# Patient Record
(demographics unavailable — no encounter records)

---

## 2024-10-07 NOTE — HISTORY OF PRESENT ILLNESS
[FreeTextEntry1] : MALKA RANDHAWA is a 30 y/o F referred by Dr. 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 Reyes office for breast reconstruction tentatively scheduled for BL mastectomy and L SNLB. Was diagnosed at age 28 with L breast cancer, with bx confirming diagnosis. Grandmother on dad's side had ovarian cancer but denies any other known familial history she received 7 rounds of chemotherapy most recently august 7th, says this was her last round she is seeing cardiologist for side effects of taxol, currently wears holter monitor, pending repeat visit/TTE results never smoker, one child, no other medications reports she is a 32C. She had previously seen a plastic surgeon in [facility] who she does not recall the name of, who discussed with her reconstructive options. she would like implants at this time based on that conversation  Interval hx (10/7/2024): Patient now POD5 b/l mx, L SLNB, b/l TE prepec with alloderm filled with 200cc saline. Doing well no complaints. Wearing surgical bra. Recording drain outputs. Pain controlled without narcotics. Completing abx.

## 2024-10-07 NOTE — HISTORY OF PRESENT ILLNESS
[FreeTextEntry1] : MALKA RANDHAWA is a 30 y/o F referred by Dr. 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 Reyes office for breast reconstruction tentatively scheduled for BL mastectomy and L SNLB. Was diagnosed at age 28 with L breast cancer, with bx confirming diagnosis. Grandmother on dad's side had ovarian cancer but denies any other known familial history she received 7 rounds of chemotherapy most recently august 7th, says this was her last round she is seeing cardiologist for side effects of taxol, currently wears holter monitor, pending repeat visit/TTE results never smoker, one child, no other medications reports she is a 32C. She had previously seen a plastic surgeon in [facility] who she does not recall the name of, who discussed with her reconstructive options. she would like implants at this time based on that conversation  Interval hx (10/7/2024): Patient now POD5 b/l mx, L SLNB, b/l TE prepec with alloderm filled with 200cc saline. Doing well no complaints. Wearing surgical bra. Recording drain outputs. Pain controlled without narcotics. Completing abx.

## 2024-10-07 NOTE — PHYSICAL EXAM
[NI] : Normal [de-identified] : Soft, nontender, Karla in place. Incisions cdi with prineo. DENNIS SS x4 [TextEntry] : Breasts: Chemo port palpable at right superior aspect of breast pole with well healed scar BL breast grade II ptosis  Right SNN 20 N IMF 8 BW 14  Left SN N 21 N IMF 8 BW 15   Abdomen: mild donor skin available port scars visible alejandro-umbilical

## 2024-10-07 NOTE — PHYSICAL EXAM
[NI] : Normal [de-identified] : Soft, nontender, Karla in place. Incisions cdi with prineo. DENNIS SS x4 [TextEntry] : Breasts: Chemo port palpable at right superior aspect of breast pole with well healed scar BL breast grade II ptosis  Right SNN 20 N IMF 8 BW 14  Left SN N 21 N IMF 8 BW 15   Abdomen: mild donor skin available port scars visible alejandro-umbilical

## 2024-10-07 NOTE — HISTORY OF PRESENT ILLNESS
[FreeTextEntry1] : MALKA RANDHAWA is a 28 y/o F referred by Dr. 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 Reyes office for breast reconstruction tentatively scheduled for BL mastectomy and L SNLB. Was diagnosed at age 28 with L breast cancer, with bx confirming diagnosis. Grandmother on dad's side had ovarian cancer but denies any other known familial history she received 7 rounds of chemotherapy most recently august 7th, says this was her last round she is seeing cardiologist for side effects of taxol, currently wears holter monitor, pending repeat visit/TTE results never smoker, one child, no other medications reports she is a 32C. She had previously seen a plastic surgeon in [facility] who she does not recall the name of, who discussed with her reconstructive options. she would like implants at this time based on that conversation  Interval hx (10/7/2024): Patient now POD5 b/l mx, L SLNB, b/l TE prepec with alloderm filled with 200cc saline. Doing well no complaints. Wearing surgical bra. Recording drain outputs. Pain controlled without narcotics. Completing abx.

## 2024-10-07 NOTE — PHYSICAL EXAM
[NI] : Normal [de-identified] : Soft, nontender, Karla in place. Incisions cdi with prineo. DENNIS SS x4 [TextEntry] : Breasts: Chemo port palpable at right superior aspect of breast pole with well healed scar BL breast grade II ptosis  Right SNN 20 N IMF 8 BW 14  Left SN N 21 N IMF 8 BW 15   Abdomen: mild donor skin available port scars visible alejandro-umbilical

## 2024-10-07 NOTE — ASSESSMENT
[FreeTextEntry1] : MALKA RANDHAWA is a 30 y/o F referred by Dr. 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  Plan: - 1 week postop - DENNIS #2 and #4 removed - Continue to record remaining drain outputs - Sponge bathe only - RTC 1 week for drain check, plan for first fill in 2 weeks

## 2024-10-07 NOTE — ASSESSMENT
[FreeTextEntry1] : MALKA RANDHAWA is a 28 y/o F referred by Dr. 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  Plan: - 1 week postop - DENNIS #2 and #4 removed - Continue to record remaining drain outputs - Sponge bathe only - RTC 1 week for drain check, plan for first fill in 2 weeks

## 2024-10-14 NOTE — ASSESSMENT
[FreeTextEntry1] : MALKA RANDHAWA is a 30 y/o F referred by Dr. 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  Plan: - return to clinic in 1 week  - continue with DENNIS drains; possible removal next week - possible TE fill next week

## 2024-10-14 NOTE — PHYSICAL EXAM
[de-identified] : Soft, nontender, Karla in place, no fluctuance. Incisions cdi with prineo. DENNIS SS x2.  Chemo port palpable at right superior aspect of breast pole with well healed scar

## 2024-10-14 NOTE — HISTORY OF PRESENT ILLNESS
[FreeTextEntry1] : MALKA RANDHAWA is a 28 y/o F referred by Dr. 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 Reyes office for breast reconstruction tentatively scheduled for BL mastectomy and L SNLB. Was diagnosed at age 28 with L breast cancer, with bx confirming diagnosis. Grandmother on dad's side had ovarian cancer but denies any other known familial history she received 7 rounds of chemotherapy most recently august 7th, says this was her last round she is seeing cardiologist for side effects of taxol, currently wears holter monitor, pending repeat visit/TTE results never smoker, one child, no other medications reports she is a 32C. She had previously seen a plastic surgeon in [facility] who she does not recall the name of, who discussed with her reconstructive options. she would like implants at this time based on that conversation  Interval hx (10/7/2024): Patient now POD5 b/l mx, L SLNB, b/l TE prepec with alloderm filled with 200cc saline. Doing well no complaints. Wearing surgical bra. Recording drain outputs. Pain controlled without narcotics. Completing abx.   Interval hx (10/14/2024): Patient now POD12 b/l mx, L SLNB, b/l TE prepec with alloderm filled with 200cc saline. Doing well without complaints and wearing surgical bra. Sponge bathing to keep dressings dry. Drain outputs have been about 70 cc/24 hours for L DENNIS and 40 cc/24 hours for R DENNIS. she denies pain, fevers, chills, malaise.

## 2024-10-17 NOTE — PHYSICAL EXAM
[Normal] : oriented to person, place and time, with appropriate affect [de-identified] : bilateral nipples and post-mastectomy flaps are well-perfused and viable.  No palpable mass or skin changes.  no ecchymosis or erythema.  bilateral drains intact with serous drainage.   no axillary fullness.   [de-identified] : Normal respiratory effort

## 2024-10-17 NOTE — HISTORY OF PRESENT ILLNESS
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 Here for post-up check.   Left breast cancer diagnosed at age 28. 2024 Patient reports feeling a lump in her left breast. She presented to her GYN's office who started workup.  3/19/24 Left 11N8 core bx revealed poorly differentiated IDC, ER 0%, NE 31-40%, HER-2 negative, Ki-67 90%. High grade DCIS.  24 Left axillary core bx of prominent LN revealed a reactive lymph node. 2024 She was seen for fertility preservation and underwent ovarian stimulation on  with egg retrieval on 24.  24 Neoadjuvant chemotherapy started with dd- Adriamycin & Cytoxan with Neulasta x 4 cycles to be followed by Taxol x 4 cycles q 2 weeks. After two cycles of AC at [facility] ([facility]) she requested to transfer care to [facility] which is closer to home.  24 S/p Taxol C1  24 s/p Taxol C2.  24 Accompanied by her mother. c/o diffuse rash over fingertips and face after latest dose of Taxol. Projected end date of chemotherapy is 24.  24 Met with Cardiology for episodes of near syncope during NAC associated with low blood pressure - plan to repeat echo. Holter monitor placed to monitor for PACs and eval of possible arrhythmia.  24- The last Taxol was withheld due to the exacerbation of pruritus following each treatment. The patient was in the ED two days ago because of severe itching.  9/3/24 Patient presents for surgical planning. Since last visit has completed NAC on 24 - Breast MRI done showing complete treatment response. She also met with plastics clinic on 24 to discuss reconstruction options. Current plan is for staged TE-implant reconstruction.  10/2/24 s/p BL NSM, L SLNB w/ TE's - Complete response to NAC, no residual left breast tumor identified. 0/10 LN negative for cancer.  10/17/24 POD#15  pain well controlled.  good ROM.  bilateral drains intact.  no fevers or chills.   No PMHx.  PSHx: Lap CCY, TMJ surgery - Reports delayed awaking after surgery.  No Meds:  NKDA Family Hx: Ovarian cancer (Paternal grandmother). Colon Cancer (Paternal aunt). Prostate cancer (paternal uncle).  GYN:  Occupation: Housewife  Social: Smoking:  Never       ETOH: Rarely consumes alcohol.

## 2024-10-17 NOTE — RESULTS/DATA
[FreeTextEntry1] : BREAST PATH/RAD REVIEW.  [facility].  3/1/24 BL Dx US: Birads 4.  - Indeterminate L 11:00 1.8 cm hypoechoic mass at area of (palp) concern (Rec US guided bx).  - L 12:30N1 0.5 cm hypoechoic lesion, probably benign (Rec 6-month f/u L Dx US if above ^ core bx is benign).  - No sonographic findings over R 4:00-6:00 additional region of (palp) concern. (Rec clinical f/u).  - Multiple b/l breast cysts (R up to 1.9 cm and L up to 0.8 cm)   3/19/24 Left [11N8] US guided core bx ([facility]): IDC (G3/poorly diff). DCIS (G3/ High grade) extends into the lobules. ER 0%, ND 31-40%, Ki-67 90%, HER2 (1+) Negative. Hemant Clip. Concordant.  (Rec Surg C/S, BL Dx MG is due at this time, given malignant core bx results a US guided bx of Left 12:30N1 lesion is now recommended).   4/3/24 Slide Review:  Left 11N8 core bx: Infiltrating poorly differentiated ductal carcinoma. High-grade DCIS. Receptors per outside report.   4/3/24 Breast MRI: BIRADS 6.  - R breast w/o suspicious enhancement.  - L UIQ 2.4 cm known large IDC.  - L 12:00 w/o suspicious findings corresponding to area for which biopsy was previously recommended; there are cysts in this region (Rec repeat US of 12-1:00 axis; possible bx)  - L 0.8 cm prominent low-lying L axillary LN which may correspond to (palp) finding (Rec Dx US w/ possible US guided bx).   4/5/24 B/L Dx MG/ L US: Birads 6. Extremely dense.  - Palpable Left superior slightly medial IDC  - L scattered breast cysts including one in the L UOQ measuring 0.8 cm. No suspicious findings at L 12-1:00.  - L 11N8 mass at site of known IDC to be 2.4 cm.  - L prominent LN w/ cortex measuring 0.5 cm (Rec US guided bx)   4/5/24 Left [axillary] US guided core bx: Reactive LN. Twirl Clip. Benign and concordant. (Rec Surg C/S).   8/28/24 Breast MRI s/p NAC: birads 6.  - R 9:00 0.6 cm inflamed cyst.  - L posterior UIQ bx clip corresponding to site of bx-proven cancer w/ interval resolution of previously noted 2.4 cm enhancing mass.  - L bx clip within small axillary LN, minimally decreased in size compared w/ prior study c/w bx-proven benign LN.   10/2/24 s/p BL NSM, L SLNB w/ TE's. ypT0N0  - Left mastectomy: No residual tumor identified s/p NAC. Tumor bed measures 2.2 x 1.5 x 0.8 cm. 0/10 Lymph nodes negative for carcinoma.   - Right mastectomy: FCC.

## 2024-10-17 NOTE — ASSESSMENT
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 Here for post-up check.  4/6/24 [facility] Genetic testing (21 gene panel): Negative  We discussed her surgical Pathology showed no residual left breast cancer after chemotherapy.  No lymph node involvement 0/10.   - We discussed risk of lymphedema for left arm and discussed preventive measures.   Will refer to PT/Lymphedema clinic for lymphedema prevention counseling.    - Following w/ Medical oncology (Dr. Nova). Completed NAC on 8/7/24 - last dose of Taxol held 2/2 exacerbation of pruritus.  - Following w/ Plastics Clinic - pending drain removals and gradual TE filling.   - Seen by Genetic counseling.  - RTO 3 months.

## 2024-10-17 NOTE — REASON FOR VISIT
[de-identified] : Bilateral nipple sparing mastectomy, Left sentinel lymph node biopsy.  [de-identified] : 10/2/24 [de-identified] : 15

## 2024-10-23 NOTE — PHYSICAL EXAM
[Well Developed] : well developed [Well Nourished] : well nourished [No Acute Distress] : no acute distress [Normal Conjunctiva] : normal conjunctiva [Normal Venous Pressure] : normal venous pressure [No Carotid Bruit] : no carotid bruit [Normal S1, S2] : normal S1, S2 [No Murmur] : no murmur [No Rub] : no rub [No Gallop] : no gallop [Clear Lung Fields] : clear lung fields [Good Air Entry] : good air entry [No Respiratory Distress] : no respiratory distress  [Soft] : abdomen soft [Non Tender] : non-tender [No Masses/organomegaly] : no masses/organomegaly [Normal Gait] : normal gait [No Edema] : no edema [No Cyanosis] : no cyanosis [No Clubbing] : no clubbing [No Varicosities] : no varicosities [Normal] : alert and oriented, normal memory

## 2024-10-24 NOTE — ASSESSMENT
[FreeTextEntry1] : Ms. Teri Quintanilla is a 30yo F with infiltrating poorly differentiated ductal carcinoma, ER negative (0%), AL 31-40%, HER-2 negative, s/p neoadjuvant AC (dose dense Adriamycin (60 mg/m2), cytoxan) bilateral mastectomy,  Echo during treatment with normal EF, normal strain.  With presyncope thought to be vasovagal alejandro-treatment cycles, and palpitations. Holter monitor with 6% PACs, symptomatic.   Plan: - TTE, biomarkers benign.  - Holter monitor w/ 6% PACs. Metop 12.5mg XL PRN palpitations  - Sleep monitor/referral for suspicion of ROMELIA; will contact office for home sleep apnea study  RTC x 6 months to see me

## 2024-10-24 NOTE — HISTORY OF PRESENT ILLNESS
[FreeTextEntry1] : Ms. Teri Quintanilal is a 28yo F with infiltrating poorly differentiated ductal carcinoma, ER negative (0%), HI 31-40%, HER-2 negative (diagnosed at age 28).  Last seen in 8/2024. At that time, reported frequent presyncope and syncope with AC (adriamycin/cytoxan). Sometimes they are typical in presentation to vasovagal where they are preceded by a flushing sensation and darkening of her vision, but other times they happen suddenly and she hardly has time to catch her footing, hold onto a railing. These occur randomly, not necessarily with changes in position/orthostatic, not necessarily right after chemo infusions. Her BP checks at home when these episodes occur are low in the systolic 80s and improve with encouraged PO intake and hydration. She otherwise denied any chest pain, orthopnea, GEOVANY, weight gain or loss (~5lbs variance at all times). She says she continues to have symptoms of palpitations in the evening time. A holter was obtained showing PACs with burden 6%. TTE was normal (including with normal strain) and biomarkers were benign.   Taxol has been held most recently due to exacerbation of pruritis following each treatment. Received topical steroids and medrol as well. She ultimately completed treatment 8/7/24.  Her  noted that she has snoring episodes and goes apneic, has never had a sleep study. I placed an order last visit and patient says no one has reached out yet.   Oncology history: 4/2024 She was seen for fertility preservation and underwent ovarian stimulation on 4/19 with egg retrieval on 5/1/24 without complication. 5/7/24 Mediport placement Initially followed at [facility] ([facility]), now transferred care to [facility] Chemo: 5/8/24 Neoadjuvant chemotherapy started with dose dense Adriamycin (60 mg/m2) & Cytoxan (600 mg/m2) with Neulasta x 4 cycles to be followed by Taxol (175 mg/m2) x 4 cycles, given every 2 weeks. Completed NAC on 8/7/24 - last dose of Taxol held 2/2 exacerbation of pruritus. Radiation: None Surgery:  Recent breast MRI reviewed consistent with complete response to NAC; underwent Bilateral nipple sparing mastectomy, Left sentinel lymph node biopsy, axillary lymph node dissection with TE reconstruction. Surgical Pathology showed no residual left breast cancer after chemotherapy. No lymph node involvement 0/10.  PMHx: as above PSurghx: fertility preservation and underwent ovarian stimulation on 4/19 with egg retrieval on 5/1/24 without complication. CHolecystectomy Family History: She has a family history of ovarian cancer in her paternal grandmother; she has a paternal aunt who was diagnosed with colon cancer; she is a paternal uncle who was diagnosed with prostate cancer. No family risk of undergoing genetic testing. There is no family history of breast cancer. Mom has ? Shx: Used to work as a . She lives at home with her 3 year old son and her boyfriend. No toxics/illicit substances  Cardiac studies: 4/19/24 SR normal axis, no significant ST changes, normal intervals 5/2/24 Echocardiogram revealed normal LV and RV size and function with LVEF = 64% and LV Global longitudinal strain -22% (normal < -19%). 8/14/24 EKG: SR with PACs x2 8/27/24 Holter: SR with 6% PACs (isolated SVEs) 9/2023 TTE EF 59%, nml RV function, no valvular disease or effusion. Left GLS -19% 10/23/24: SR 88

## 2024-10-24 NOTE — HISTORY OF PRESENT ILLNESS
[FreeTextEntry1] : Ms. Teri Quintanilla is a 30yo F with infiltrating poorly differentiated ductal carcinoma, ER negative (0%), TN 31-40%, HER-2 negative (diagnosed at age 28).  Last seen in 8/2024. At that time, reported frequent presyncope and syncope with AC (adriamycin/cytoxan). Sometimes they are typical in presentation to vasovagal where they are preceded by a flushing sensation and darkening of her vision, but other times they happen suddenly and she hardly has time to catch her footing, hold onto a railing. These occur randomly, not necessarily with changes in position/orthostatic, not necessarily right after chemo infusions. Her BP checks at home when these episodes occur are low in the systolic 80s and improve with encouraged PO intake and hydration. She otherwise denied any chest pain, orthopnea, GEOVANY, weight gain or loss (~5lbs variance at all times). She says she continues to have symptoms of palpitations in the evening time. A holter was obtained showing PACs with burden 6%. TTE was normal (including with normal strain) and biomarkers were benign.   Taxol has been held most recently due to exacerbation of pruritis following each treatment. Received topical steroids and medrol as well. She ultimately completed treatment 8/7/24.  Her  noted that she has snoring episodes and goes apneic, has never had a sleep study. I placed an order last visit and patient says no one has reached out yet.   Oncology history: 4/2024 She was seen for fertility preservation and underwent ovarian stimulation on 4/19 with egg retrieval on 5/1/24 without complication. 5/7/24 Mediport placement Initially followed at [facility] ([facility]), now transferred care to [facility] Chemo: 5/8/24 Neoadjuvant chemotherapy started with dose dense Adriamycin (60 mg/m2) & Cytoxan (600 mg/m2) with Neulasta x 4 cycles to be followed by Taxol (175 mg/m2) x 4 cycles, given every 2 weeks. Completed NAC on 8/7/24 - last dose of Taxol held 2/2 exacerbation of pruritus. Radiation: None Surgery:  Recent breast MRI reviewed consistent with complete response to NAC; underwent Bilateral nipple sparing mastectomy, Left sentinel lymph node biopsy, axillary lymph node dissection with TE reconstruction. Surgical Pathology showed no residual left breast cancer after chemotherapy. No lymph node involvement 0/10.  PMHx: as above PSurghx: fertility preservation and underwent ovarian stimulation on 4/19 with egg retrieval on 5/1/24 without complication. CHolecystectomy Family History: She has a family history of ovarian cancer in her paternal grandmother; she has a paternal aunt who was diagnosed with colon cancer; she is a paternal uncle who was diagnosed with prostate cancer. No family risk of undergoing genetic testing. There is no family history of breast cancer. Mom has ? Shx: Used to work as a . She lives at home with her 3 year old son and her boyfriend. No toxics/illicit substances  Cardiac studies: 4/19/24 SR normal axis, no significant ST changes, normal intervals 5/2/24 Echocardiogram revealed normal LV and RV size and function with LVEF = 64% and LV Global longitudinal strain -22% (normal < -19%). 8/14/24 EKG: SR with PACs x2 8/27/24 Holter: SR with 6% PACs (isolated SVEs) 9/2023 TTE EF 59%, nml RV function, no valvular disease or effusion. Left GLS -19% 10/23/24: SR 88

## 2024-10-24 NOTE — END OF VISIT
[FreeTextEntry3] : Teri Quintanilla is a 29-year-old woman with infiltrating poorly differentiated ductal carcinoma, ER negative (0%), FL 31-40%, HER-2 negative (diagnosed at age 28). She was last seen in Aug 2024. At that time, there were episodes of presyncope and syncope on AC (adriamycin/cytoxan), likely vasovagal as they are preceded by a flushing sensation and darkening of her vision, though sometimes episodes occur suddenly and randomly, not necessarily with changes in position or after chemo infusions. Self-obtained BP  at the time of episodes note SBP low 80s mm Hg and improve with encouraged PO intake and hydration. She otherwise denied any chest pain, orthopnea, GEOVANY, weight gain or loss (~5lbs variance). There are intermittent palpitations, often in the evenings. Twenty-four hour Holter showed PACs with burden 6%. TTE was normal (including with normal strain) and biomarkers were benign. Taxol was held most recently due to exacerbation of pruritis following each treatment, s/p topical steroids and oral Medrol. She ultimately completed treatment 8/7/24. Her  witnessed episodes of snoring and apnea. She has not had sleep study.  Oncology history: 4/2024 She was seen for fertility preservation and underwent ovarian stimulation on 4/19 with egg retrieval on 5/1/24 without complication. 5/7/24 Mediport placement Initially followed at [facility]), now transferred care to [facility] Chemo: 5/8/24 Neoadjuvant chemotherapy started with dose dense Adriamycin (60 mg/m2) & Cytoxan (600 mg/m2) with Neulasta x 4 cycles to be followed by Taxol (175 mg/m2) x 4 cycles, given every 2 weeks. Completed NAC on 8/7/24 - last dose of Taxol held 2/2 exacerbation of pruritus. Radiation: None Surgery: Recent breast MRI reviewed consistent with complete response to NAC; underwent Bilateral nipple sparing mastectomy, Left sentinel lymph node biopsy, axillary lymph node dissection with TE reconstruction. Surgical Pathology showed no residual left breast cancer after chemotherapy. No lymph node involvement 0/10.   Cardiac studies: 4/19/24 SR normal axis, no significant ST changes, normal intervals 5/2/24 Echocardiogram revealed normal LV and RV size and function with LVEF = 64% and LV Global longitudinal strain -22% (normal < -19%). 8/14/24 EKG: SR with PACs x2 8/27/24 Holter: SR with 6% PACs (isolated SVEs) 9/2023 TTE EF 59%, nml RV function, no valvular disease or effusion. Left GLS -19% ECG 10/23/24: NSR at 88 bpm with normal ST-segments and T-waves Based on overall findings, management will include periodic TTE monitoring for cardiotoxicity. Start metoprolol succinate ER 25 mg, one half tablet daily during periods of palpitations.  Order Home Sleep Apnea Test.

## 2024-10-24 NOTE — ASSESSMENT
[FreeTextEntry1] : Ms. Teri Quintanilla is a 28yo F with infiltrating poorly differentiated ductal carcinoma, ER negative (0%), DE 31-40%, HER-2 negative, s/p neoadjuvant AC (dose dense Adriamycin (60 mg/m2), cytoxan) bilateral mastectomy,  Echo during treatment with normal EF, normal strain.  With presyncope thought to be vasovagal alejandro-treatment cycles, and palpitations. Holter monitor with 6% PACs, symptomatic.   Plan: - TTE, biomarkers benign.  - Holter monitor w/ 6% PACs. Metop 12.5mg XL PRN palpitations  - Sleep monitor/referral for suspicion of ROMELIA; will contact office for home sleep apnea study  RTC x 6 months to see me

## 2024-10-24 NOTE — END OF VISIT
[FreeTextEntry3] : Teri Quintanilla is a 29-year-old woman with infiltrating poorly differentiated ductal carcinoma, ER negative (0%), NV 31-40%, HER-2 negative (diagnosed at age 28). She was last seen in Aug 2024. At that time, there were episodes of presyncope and syncope on AC (adriamycin/cytoxan), likely vasovagal as they are preceded by a flushing sensation and darkening of her vision, though sometimes episodes occur suddenly and randomly, not necessarily with changes in position or after chemo infusions. Self-obtained BP  at the time of episodes note SBP low 80s mm Hg and improve with encouraged PO intake and hydration. She otherwise denied any chest pain, orthopnea, GEOVANY, weight gain or loss (~5lbs variance). There are intermittent palpitations, often in the evenings. Twenty-four hour Holter showed PACs with burden 6%. TTE was normal (including with normal strain) and biomarkers were benign. Taxol was held most recently due to exacerbation of pruritis following each treatment, s/p topical steroids and oral Medrol. She ultimately completed treatment 8/7/24. Her  witnessed episodes of snoring and apnea. She has not had sleep study.  Oncology history: 4/2024 She was seen for fertility preservation and underwent ovarian stimulation on 4/19 with egg retrieval on 5/1/24 without complication. 5/7/24 Mediport placement Initially followed at [facility]), now transferred care to [facility] Chemo: 5/8/24 Neoadjuvant chemotherapy started with dose dense Adriamycin (60 mg/m2) & Cytoxan (600 mg/m2) with Neulasta x 4 cycles to be followed by Taxol (175 mg/m2) x 4 cycles, given every 2 weeks. Completed NAC on 8/7/24 - last dose of Taxol held 2/2 exacerbation of pruritus. Radiation: None Surgery: Recent breast MRI reviewed consistent with complete response to NAC; underwent Bilateral nipple sparing mastectomy, Left sentinel lymph node biopsy, axillary lymph node dissection with TE reconstruction. Surgical Pathology showed no residual left breast cancer after chemotherapy. No lymph node involvement 0/10.   Cardiac studies: 4/19/24 SR normal axis, no significant ST changes, normal intervals 5/2/24 Echocardiogram revealed normal LV and RV size and function with LVEF = 64% and LV Global longitudinal strain -22% (normal < -19%). 8/14/24 EKG: SR with PACs x2 8/27/24 Holter: SR with 6% PACs (isolated SVEs) 9/2023 TTE EF 59%, nml RV function, no valvular disease or effusion. Left GLS -19% ECG 10/23/24: NSR at 88 bpm with normal ST-segments and T-waves Based on overall findings, management will include periodic TTE monitoring for cardiotoxicity. Start metoprolol succinate ER 25 mg, one half tablet daily during periods of palpitations.  Order Home Sleep Apnea Test.

## 2024-10-28 NOTE — PHYSICAL EXAM
[de-identified] : Other: Breasts: Soft, nontender, Karla in place, no fluctuance. Incisions cdi with prineo. DENNIS SS x2.  Chemo port palpable at right superior aspect of breast pole with well healed scar

## 2024-10-28 NOTE — PHYSICAL EXAM
[de-identified] : Other: Breasts: Soft, nontender, Karla in place, no fluctuance. Incisions cdi with prineo. DENNIS SS x2.  Chemo port palpable at right superior aspect of breast pole with well healed scar

## 2024-10-28 NOTE — HISTORY OF PRESENT ILLNESS
[FreeTextEntry1] : MALKA RANDHAWA is a 28 y/o F referred by Dr. 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 Reyes's office for breast reconstruction tentatively scheduled for BL mastectomy and L SNLB. Was diagnosed at age 28 with L breast cancer, with bx confirming diagnosis. Grandmother on dad's side had ovarian cancer but denies any other known familial history. She received 7 rounds of chemotherapy most recently august 7th, says this was her last round. She is seeing a cardiologist for side effects of taxol, currently wears holter monitor, pending repeat visit/TTE results. Never smoker, one child, no other medications. Reports she is a 32C. She had previously seen a plastic surgeon at [facility] whom she does not recall the name of, who discussed with her reconstructive options. she would like implants at this time based on that conversation.  Interval hx (10/7/2024): Patient now POD5 b/l mx, L SLNB, b/l TE prepec with alloderm filled with 200cc saline. Doing well no complaints. Wearing surgical bra. Recording drain outputs. Pain controlled without narcotics. Completing abx.  Interval hx (10/14/2024): Patient now POD12 b/l mx, L SLNB, b/l TE prepec with alloderm filled with 200cc saline. Doing well without complaints and wearing surgical bra. Sponge bathing to keep dressings dry. Drain outputs have been about 70 cc/24 hours for L DENNIS and 40 cc/24 hours for R DENNIS. she denies pain, fevers, chills, malaise.  Interval hx (10/21/2024): Patient now POD19 s/p BL mx, L SLNB, BL TE prepec with alloderm. Currently with 200cc saline from OR. No issues except some muscle discomfort from the left superior TE tab attachment. Has not been showering. Drain outputs low b/l, removed in clinic.  Interval hx (10/28/2024): Patient now POD26 s/p BL mx, L SLNB, BL TE prepec with alloderm. Currently with 300cc saline. no issues. showering. no errythema. no discharge. no swelling. L breast larger than Right. Filled in clinic 10/28 w/ 100 cc bilaterally for a total of 400

## 2024-10-28 NOTE — ASSESSMENT
[FreeTextEntry1] : MALKA RANDHAWA is a 30 y/o F referred by Dr. 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  Plan: - 100cc fill added b/l, now 400cc saline per side - 200 post op, 100cc - 10/21, 100cc - 10/28 - RTC 1 week.  PRS

## 2024-10-28 NOTE — ASSESSMENT
[FreeTextEntry1] : MALKA RANDHAWA is a 28 y/o F referred by Dr. 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  Plan: - 100cc fill added b/l, now 400cc saline per side - 200 post op, 100cc - 10/21, 100cc - 10/28 - RTC 1 week.  PRS

## 2024-10-28 NOTE — HISTORY OF PRESENT ILLNESS
[FreeTextEntry1] : MALKA RANDHAWA is a 28 y/o F referred by Dr. 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 Reyes's office for breast reconstruction tentatively scheduled for BL mastectomy and L SNLB. Was diagnosed at age 28 with L breast cancer, with bx confirming diagnosis. Grandmother on dad's side had ovarian cancer but denies any other known familial history. She received 7 rounds of chemotherapy most recently august 7th, says this was her last round. She is seeing a cardiologist for side effects of taxol, currently wears holter monitor, pending repeat visit/TTE results. Never smoker, one child, no other medications. Reports she is a 32C. She had previously seen a plastic surgeon at [facility] whom she does not recall the name of, who discussed with her reconstructive options. she would like implants at this time based on that conversation.  Interval hx (10/7/2024): Patient now POD5 b/l mx, L SLNB, b/l TE prepec with alloderm filled with 200cc saline. Doing well no complaints. Wearing surgical bra. Recording drain outputs. Pain controlled without narcotics. Completing abx.  Interval hx (10/14/2024): Patient now POD12 b/l mx, L SLNB, b/l TE prepec with alloderm filled with 200cc saline. Doing well without complaints and wearing surgical bra. Sponge bathing to keep dressings dry. Drain outputs have been about 70 cc/24 hours for L DENNIS and 40 cc/24 hours for R DENNIS. she denies pain, fevers, chills, malaise.  Interval hx (10/21/2024): Patient now POD19 s/p BL mx, L SLNB, BL TE prepec with alloderm. Currently with 200cc saline from OR. No issues except some muscle discomfort from the left superior TE tab attachment. Has not been showering. Drain outputs low b/l, removed in clinic.  Interval hx (10/28/2024): Patient now POD26 s/p BL mx, L SLNB, BL TE prepec with alloderm. Currently with 300cc saline. no issues. showering. no errythema. no discharge. no swelling. L breast larger than Right. Filled in clinic 10/28 w/ 100 cc bilaterally for a total of 400

## 2024-10-30 NOTE — PAST MEDICAL HISTORY
[Menstruating] : The patient is menstruating [Definite ___ (Date)] : the last menstrual period was [unfilled] [Regular Cycle Intervals] : have been regular [Live Births ___] : P[unfilled]  [Age At Live Birth ___] : Age at live birth: [unfilled] [History of Hormone Replacement Treatment] : has no history of hormone replacement treatment

## 2024-10-30 NOTE — PHYSICAL EXAM
[Fully active, able to carry on all pre-disease performance without restriction] : Status 0 - Fully active, able to carry on all pre-disease performance without restriction [Normal] : affect appropriate [de-identified] : Bilateral nipple sparing mastectomies with TE reconstructions.  [de-identified] : Fine macular rash on arms

## 2024-10-30 NOTE — HISTORY OF PRESENT ILLNESS
[Disease: _____________________] : Disease: [unfilled] [T: ___] : T[unfilled] [N: ___] : N[unfilled] [M: ___] : M[unfilled] [AJCC Stage: ____] : AJCC Stage: [unfilled] [de-identified] : Left breast cancer diagnosed at age 28. The patient felt a lump in her left breast in 1/2024. She presented to her GYN's office. 3/1/24 Bilateral ultrasound identified a 1.8 cm mass in the left breast at 11:00. No abnormal axillary lymph nodes identified. 3/19/24 Left breast core biopsy revealed infiltrating poorly differentiated ductal carcinoma, ER negative (0%), CO 31-40%, HER-2 negative, Ki-67 90%. Also noted was DCIS, high-grade, solid type. 4/3/24 Breast MRI revealed no suspicious enhancement in the right breast and negative for right axillary adenopathy. Evaluation of the left breast demonstrates known large invasive ductal carcinoma in the upper/inner quadrant measuring 2.4 cm. There is no suspicious finding at the 12:00 axis of the left breast to correspond to the finding on sonogram for which biopsy was previously recommended; there are cysts in this region and repeat ultrasound is recommended. There is a prominent low-lying axillary lymph node measuring 8 mm which may correspond to the palpable finding and targeted ultrasound to potentially guide biopsy is recommended. 4/5/24 Bilateral diagnostic mammogram and sonogram revealed extremely dense breasts which lowers the sensitivity of mammography. Evaluation of the left breast demonstrates a mass far posteriorly superiorly containing clip from prior biopsy, corresponding to known carcinoma measuring 2.3 x 2.4 cm. There is a prominent lymph node in left axilla with cortex measuring 5 mm for which ultrasound-guided biopsy is recommended. No suspicious calcifications. Evaluation of the right breast demonstrates no suspicious finding. 4/5/24 Core biopsy of left axillary lymph node revealed reactive lymph node. 4/2024 She was seen for fertility preservation and underwent ovarian stimulation on 4/19 with egg retrieval on 5/1/24 without complication. 5/2/24 Echocardiogram revealed normal LV and RV size and function with LVEF = 64% and LV Global longitudinal strain -22% (normal < -19%). 5/7/24 Mediport placement with tip of catheter in right atrium and approved for use. 5/8/24 - 8/21/24 Neoadjuvant chemotherapy with dose dense Adriamycin (60 mg/m2) & Cytoxan (600 mg/m2) with Neulasta x 4 cycles followed by Taxol (175 mg/m2) x 4 cycles, given every 2 weeks. After two cycles of AC at [facility]) she requested to transfer care to [facility] which is closer to home. 10/2/24 Bilateral nipple sparing mastectomies with Dr. Reyes and tissue expander reconstruction by Dr. Byrd.      Left breast showed no residual tumor.  Tumor bed measured 2.2 x 1.5 x 0.8 cm. 0/10 SLN.      Right breast showed fibrocystic changes 11/5/24 Lupron 22.5 mg every 3 months with exemestane 25 mg daily.    [de-identified] : Clinically ~2 cm IDC, LN negative on biopsy, ER 0%, AZ 31-40%, HER-2 negative, Ki-67 90% [de-identified] : Teri completed chemotherapy in  with the last cycle of Taxol held due to pruritus c/w drug intolerance. She had bilateral mastectomies on 10/2/24 which revealed no residual disease. Her energy is improving and she has recovered well. No further syncopal events. LMP 24. She is getting hot flashes which do wake her up at night.  HEALTH MAINTENANCE: PCP: Dr. Nitza Murray in [place] GYN: Dr. Fox Ivy in [place] Mammogram & sonogram: 24 Pap Smear:  negative Genetic testin24 [employer] 21 gene panel with no clinically significant variants detected Family History: She has a family history of ovarian cancer in her paternal grandmother; she has a paternal aunt who was diagnosed with colon cancer; she is a paternal uncle who was diagnosed with prostate cancer.  No family risk of undergoing genetic testing.  There is no family history of breast cancer.

## 2024-10-30 NOTE — PHYSICAL EXAM
[Fully active, able to carry on all pre-disease performance without restriction] : Status 0 - Fully active, able to carry on all pre-disease performance without restriction [Normal] : affect appropriate [de-identified] : Bilateral nipple sparing mastectomies with TE reconstructions.  [de-identified] : Fine macular rash on arms

## 2024-10-30 NOTE — HISTORY OF PRESENT ILLNESS
[Disease: _____________________] : Disease: [unfilled] [T: ___] : T[unfilled] [N: ___] : N[unfilled] [M: ___] : M[unfilled] [AJCC Stage: ____] : AJCC Stage: [unfilled] [de-identified] : Left breast cancer diagnosed at age 28. The patient felt a lump in her left breast in 1/2024. She presented to her GYN's office. 3/1/24 Bilateral ultrasound identified a 1.8 cm mass in the left breast at 11:00. No abnormal axillary lymph nodes identified. 3/19/24 Left breast core biopsy revealed infiltrating poorly differentiated ductal carcinoma, ER negative (0%), [place] 31-40%, HER-2 negative, Ki-67 90%. Also noted was DCIS, high-grade, solid type. 4/3/24 Breast MRI revealed no suspicious enhancement in the right breast and negative for right axillary adenopathy. Evaluation of the left breast demonstrates known large invasive ductal carcinoma in the upper/inner quadrant measuring 2.4 cm. There is no suspicious finding at the 12:00 axis of the left breast to correspond to the finding on sonogram for which biopsy was previously recommended; there are cysts in this region and repeat ultrasound is recommended. There is a prominent low-lying axillary lymph node measuring 8 mm which may correspond to the palpable finding and targeted ultrasound to potentially guide biopsy is recommended. 4/5/24 Bilateral diagnostic mammogram and sonogram revealed extremely dense breasts which lowers the sensitivity of mammography. Evaluation of the left breast demonstrates a mass far posteriorly superiorly containing clip from prior biopsy, corresponding to known carcinoma measuring 2.3 x 2.4 cm. There is a prominent lymph node in left axilla with cortex measuring 5 mm for which ultrasound-guided biopsy is recommended. No suspicious calcifications. Evaluation of the right breast demonstrates no suspicious finding. 4/5/24 Core biopsy of left axillary lymph node revealed reactive lymph node. 4/2024 She was seen for fertility preservation and underwent ovarian stimulation on 4/19 with egg retrieval on 5/1/24 without complication. 5/2/24 Echocardiogram revealed normal LV and RV size and function with LVEF = 64% and LV Global longitudinal strain -22% (normal < -19%). 5/7/24 Mediport placement with tip of catheter in right atrium and approved for use. 5/8/24 - 8/21/24 Neoadjuvant chemotherapy with dose dense Adriamycin (60 mg/m2) & Cytoxan (600 mg/m2) with Neulasta x 4 cycles followed by Taxol (175 mg/m2) x 4 cycles, given every 2 weeks. After two cycles of AC at [facility]) she requested to transfer care to [facility] [place] which is closer to home. 10/2/24 Bilateral nipple sparing mastectomies with Dr. Reyes and tissue expander reconstruction by Dr. Byrd.      Left breast showed no residual tumor.  Tumor bed measured 2.2 x 1.5 x 0.8 cm. 0/10 SLN.      Right breast showed fibrocystic changes 11/5/24 Lupron 22.5 mg every 3 months with exemestane 25 mg daily.    [de-identified] : Clinically ~2 cm IDC, LN negative on biopsy, ER 0%, NE 31-40%, HER-2 negative, Ki-67 90% [de-identified] : Teri completed chemotherapy in  with the last cycle of Taxol held due to pruritus c/w drug intolerance. She had bilateral mastectomies on 10/2/24 which revealed no residual disease. Her energy is improving and she has recovered well. No further syncopal events. LMP 24. She is getting hot flashes which do wake her up at night.  HEALTH MAINTENANCE: PCP: Dr. Nitza Murray in [place] GYN: Dr. Fox Ivy in [place] Mammogram & sonogram: 24 Pap Smear:  negative Genetic testin24 [employer] 21 gene panel with no clinically significant variants detected Family History: She has a family history of ovarian cancer in her paternal grandmother; she has a paternal aunt who was diagnosed with colon cancer; she is a paternal uncle who was diagnosed with prostate cancer.  No family risk of undergoing genetic testing.  There is no family history of breast cancer.

## 2024-11-04 NOTE — HISTORY OF PRESENT ILLNESS
[FreeTextEntry1] : MALKA RANDHAWA is a 30 y/o F referred by Dr. 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 Reyes's office for breast reconstruction tentatively scheduled for BL mastectomy and L SNLB. Was diagnosed at age 28 with L breast cancer, with bx confirming diagnosis. Grandmother on dad's side had ovarian cancer but denies any other known familial history. She received 7 rounds of chemotherapy most recently august 7th, says this was her last round. She is seeing a cardiologist for side effects of taxol, currently wears holter monitor, pending repeat visit/TTE results. Never smoker, one child, no other medications. Reports she is a 32C. She had previously seen a plastic surgeon at [facility] whom she does not recall the name of, who discussed with her reconstructive options. she would like implants at this time based on that conversation.  Interval hx (10/7/2024): Patient now POD5 b/l mx, L SLNB, b/l TE prepec with alloderm filled with 200cc saline. Doing well no complaints. Wearing surgical bra. Recording drain outputs. Pain controlled without narcotics. Completing abx.  Interval hx (10/14/2024): Patient now POD12 b/l mx, L SLNB, b/l TE prepec with alloderm filled with 200cc saline. Doing well without complaints and wearing surgical bra. Sponge bathing to keep dressings dry. Drain outputs have been about 70 cc/24 hours for L DENNIS and 40 cc/24 hours for R DENNIS. she denies pain, fevers, chills, malaise.  Interval hx (10/21/2024): Patient now POD19 s/p BL mx, L SLNB, BL TE prepec with alloderm. Currently with 200cc saline from OR. No issues except some muscle discomfort from the left superior TE tab attachment. Has not been showering. Drain outputs low b/l, removed in clinic.  Interval hx (10/28/2024): Patient now POD26 s/p BL mx, L SLNB, BL TE prepec with alloderm. Currently with 300cc saline. no issues. showering. no erythema. no discharge. no swelling. L breast larger than Right. Filled in clinic 10/28 w/ 100 cc bilaterally for a total of 400  Interval hx (11/04/24): Patient now POD33 s/p BL mx, L SLNB, BL TE prepec with alloderm. Currently with 400cc saline.  11/4/24: Patient doing well. No fevers, chills. Notes L arm pain and is starting some massage therapy. Notes intermittent medial chest pain, but seems to come and go without any provocation.

## 2024-11-04 NOTE — PHYSICAL EXAM
[de-identified] : Other: Other: Breasts: Soft, nontender, Karla in place, no fluctuance. Incisions cdi with prineo. DENNIS SS x2.  Chemo port palpable at right superior aspect of breast pole with well healed scar

## 2024-11-04 NOTE — ASSESSMENT
[FreeTextEntry1] : MALKA RANDHAWA is a 28 y/o F referred by Dr. 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  Patient filled 100cc bilaterally, now at 500cc. Initial tissue expander was 500cc so likely at final fill. patient has desire to possibly overfill. Will discuss at next visit. noted to have some L breast tightness during last 10cc of fill that passed almost immediately.   Plan: - 100cc fill added b/l, now 500cc saline per side - 200 post op, 100cc - 10/21, 100cc - 10/28 - RTC 1 week to discuss possibly overexpand vs booking for surgery for exchange  Jose Bray PRS PGY3

## 2024-11-04 NOTE — HISTORY OF PRESENT ILLNESS
[FreeTextEntry1] : MALKA RANDHAWA is a 30 y/o F referred by Dr. 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 Reyes's office for breast reconstruction tentatively scheduled for BL mastectomy and L SNLB. Was diagnosed at age 28 with L breast cancer, with bx confirming diagnosis. Grandmother on dad's side had ovarian cancer but denies any other known familial history. She received 7 rounds of chemotherapy most recently august 7th, says this was her last round. She is seeing a cardiologist for side effects of taxol, currently wears holter monitor, pending repeat visit/TTE results. Never smoker, one child, no other medications. Reports she is a 32C. She had previously seen a plastic surgeon at [facility] whom she does not recall the name of, who discussed with her reconstructive options. she would like implants at this time based on that conversation.  Interval hx (10/7/2024): Patient now POD5 b/l mx, L SLNB, b/l TE prepec with alloderm filled with 200cc saline. Doing well no complaints. Wearing surgical bra. Recording drain outputs. Pain controlled without narcotics. Completing abx.  Interval hx (10/14/2024): Patient now POD12 b/l mx, L SLNB, b/l TE prepec with alloderm filled with 200cc saline. Doing well without complaints and wearing surgical bra. Sponge bathing to keep dressings dry. Drain outputs have been about 70 cc/24 hours for L DENNIS and 40 cc/24 hours for R DENNIS. she denies pain, fevers, chills, malaise.  Interval hx (10/21/2024): Patient now POD19 s/p BL mx, L SLNB, BL TE prepec with alloderm. Currently with 200cc saline from OR. No issues except some muscle discomfort from the left superior TE tab attachment. Has not been showering. Drain outputs low b/l, removed in clinic.  Interval hx (10/28/2024): Patient now POD26 s/p BL mx, L SLNB, BL TE prepec with alloderm. Currently with 300cc saline. no issues. showering. no erythema. no discharge. no swelling. L breast larger than Right. Filled in clinic 10/28 w/ 100 cc bilaterally for a total of 400  Interval hx (11/04/24): Patient now POD33 s/p BL mx, L SLNB, BL TE prepec with alloderm. Currently with 400cc saline.  11/4/24: Patient doing well. No fevers, chills. Notes L arm pain and is starting some massage therapy. Notes intermittent medial chest pain, but seems to come and go without any provocation.

## 2024-11-04 NOTE — PHYSICAL EXAM
[de-identified] : Other: Other: Breasts: Soft, nontender, Karla in place, no fluctuance. Incisions cdi with prineo. DENNIS SS x2.  Chemo port palpable at right superior aspect of breast pole with well healed scar

## 2024-11-11 NOTE — HISTORY OF PRESENT ILLNESS
[FreeTextEntry1] : MALKA RANDHAWA is a 28 y/o F referred by Dr. 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 Reyes's office for breast reconstruction tentatively scheduled for BL mastectomy and L SNLB. Was diagnosed at age 28 with L breast cancer, with bx confirming diagnosis. Grandmother on dad's side had ovarian cancer but denies any other known familial history. She received 7 rounds of chemotherapy most recently august 7th, says this was her last round. She is seeing a cardiologist for side effects of taxol, currently wears holter monitor, pending repeat visit/TTE results. Never smoker, one child, no other medications. Reports she is a 32C. She had previously seen a plastic surgeon at [facility] whom she does not recall the name of, who discussed with her reconstructive options. she would like implants at this time based on that conversation.  Interval hx (10/7/2024): Patient now POD5 b/l mx, L SLNB, b/l TE prepec with alloderm filled with 200cc saline. Doing well no complaints. Wearing surgical bra. Recording drain outputs. Pain controlled without narcotics. Completing abx.  Interval hx (10/14/2024): Patient now POD12 b/l mx, L SLNB, b/l TE prepec with alloderm filled with 200cc saline. Doing well without complaints and wearing surgical bra. Sponge bathing to keep dressings dry. Drain outputs have been about 70 cc/24 hours for L DENNIS and 40 cc/24 hours for R DENNIS. she denies pain, fevers, chills, malaise.  Interval hx (10/21/2024): Patient now POD19 s/p BL mx, L SLNB, BL TE prepec with alloderm. Currently with 200cc saline from OR. No issues except some muscle discomfort from the left superior TE tab attachment. Has not been showering. Drain outputs low b/l, removed in clinic.  Interval hx (10/28/2024): Patient now POD26 s/p BL mx, L SLNB, BL TE prepec with alloderm. Currently with 300cc saline. no issues. showering. no erythema. no discharge. no swelling. L breast larger than Right. Filled in clinic 10/28 w/ 100 cc bilaterally for a total of 400  Interval hx (11/04/24): Patient now POD33 s/p BL mx, L SLNB, BL TE prepec with alloderm. Currently with 400cc saline. Patient doing well. No fevers, chills. Notes L arm pain and is starting some massage therapy. Notes intermittent medial chest pain, but seems to come and go without any provocation. Filled in clinic with 100 cc bilaterally for a total of 500cc.  Interval hx (11/11/24): Patient s/p BL mx w/ SLNB L, B/L TE prepec with alloderm. Currently happy with her size at 500cc filled bilaterally. Interested in booking for TE exchange for implants in early December or potentially sooner.

## 2024-11-11 NOTE — HISTORY OF PRESENT ILLNESS
[FreeTextEntry1] : MALKA RANDHAWA is a 30 y/o F referred by Dr. 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 Reyes's office for breast reconstruction tentatively scheduled for BL mastectomy and L SNLB. Was diagnosed at age 28 with L breast cancer, with bx confirming diagnosis. Grandmother on dad's side had ovarian cancer but denies any other known familial history. She received 7 rounds of chemotherapy most recently august 7th, says this was her last round. She is seeing a cardiologist for side effects of taxol, currently wears holter monitor, pending repeat visit/TTE results. Never smoker, one child, no other medications. Reports she is a 32C. She had previously seen a plastic surgeon at [facility] whom she does not recall the name of, who discussed with her reconstructive options. she would like implants at this time based on that conversation.  Interval hx (10/7/2024): Patient now POD5 b/l mx, L SLNB, b/l TE prepec with alloderm filled with 200cc saline. Doing well no complaints. Wearing surgical bra. Recording drain outputs. Pain controlled without narcotics. Completing abx.  Interval hx (10/14/2024): Patient now POD12 b/l mx, L SLNB, b/l TE prepec with alloderm filled with 200cc saline. Doing well without complaints and wearing surgical bra. Sponge bathing to keep dressings dry. Drain outputs have been about 70 cc/24 hours for L DENNIS and 40 cc/24 hours for R DENNIS. she denies pain, fevers, chills, malaise.  Interval hx (10/21/2024): Patient now POD19 s/p BL mx, L SLNB, BL TE prepec with alloderm. Currently with 200cc saline from OR. No issues except some muscle discomfort from the left superior TE tab attachment. Has not been showering. Drain outputs low b/l, removed in clinic.  Interval hx (10/28/2024): Patient now POD26 s/p BL mx, L SLNB, BL TE prepec with alloderm. Currently with 300cc saline. no issues. showering. no erythema. no discharge. no swelling. L breast larger than Right. Filled in clinic 10/28 w/ 100 cc bilaterally for a total of 400  Interval hx (11/04/24): Patient now POD33 s/p BL mx, L SLNB, BL TE prepec with alloderm. Currently with 400cc saline. Patient doing well. No fevers, chills. Notes L arm pain and is starting some massage therapy. Notes intermittent medial chest pain, but seems to come and go without any provocation. Filled in clinic with 100 cc bilaterally for a total of 500cc.  Interval hx (11/11/24): Patient s/p BL mx w/ SLNB L, B/L TE prepec with alloderm. Currently happy with her size at 500cc filled bilaterally. Interested in booking for TE exchange for implants in early December or potentially sooner.

## 2024-11-11 NOTE — ASSESSMENT
[FreeTextEntry1] : MALKA RANDHAWA is a 30 y/o F referred by Dr. 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  Patient filled 500cc bilaterally at max capacity for bilateral tissue expanders. Patient states she is happy with her size and would like to undergo final implant placement. Discussed options for implant selection and operative timing.   Plan: - 500cc bilateral TE fill - will order implants (500cc, 550cc, 600cc, 650cc moderate profile silicone) and plan for final implant placement. - will discuss with Dr. Villafana OR coordination  PRS

## 2024-11-11 NOTE — ASSESSMENT
[FreeTextEntry1] : MALKA RANDHAWA is a 28 y/o F referred by Dr. 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  Patient filled 500cc bilaterally at max capacity for bilateral tissue expanders. Patient states she is happy with her size and would like to undergo final implant placement. Discussed options for implant selection and operative timing.   Plan: - 500cc bilateral TE fill - will order implants (500cc, 550cc, 600cc, 650cc moderate profile silicone) and plan for final implant placement. - will discuss with Dr. Villafana OR coordination  PRS

## 2024-11-11 NOTE — PHYSICAL EXAM
[de-identified] : Breasts: Soft, nontender, Karla in place, no fluctuance. Incisions cdi. Left breast with superomedial rippling, higher TE position and lateral/inferior NAC compared to right breast. BW: R 14 L 15  Chemo port removed Wednesday, dressing c/d/i. 1.5 cm healing blister inferior to closed port site.

## 2024-11-11 NOTE — PHYSICAL EXAM
[de-identified] : Breasts: Soft, nontender, Karla in place, no fluctuance. Incisions cdi. Left breast with superomedial rippling, higher TE position and lateral/inferior NAC compared to right breast. BW: R 14 L 15  Chemo port removed Wednesday, dressing c/d/i. 1.5 cm healing blister inferior to closed port site.

## 2024-12-06 NOTE — HISTORY OF PRESENT ILLNESS
[FreeTextEntry1] : Thigh and Knee Pain [de-identified] : Ms Teri Quintanilla is a 29 year old female with a history of poorly differentiated DCIS (1/2024) s/p chemotherapy and bilateral mastectomy (10/2024) presenting as a NPA.  Had ovulation induction with egg freezing to preserve fertility.  Now in early menopause due to her chemotherapy treatment and takes exemestane and gabapentin for her hot flashes.  Reports that her gabapentin makes her feel sleepy in the morning.  In addition she gets Lupron injections ever 12 weeks.  Her main complaint is that over the past 1-2 months the pain has had worsening thigh and joint pain.  Hurts when walking or driving.  Starts off with stiffness and improves after walking for a few minutes.  She associates it with when she began taking taxol.  Located in the thigh muscles and knee joints.  Additionally reports occasional blurry vision with associated headaches, mostly when driving at night.  Has occasional palpitations without other associated symptoms.  Has been seen by cardiology who prescribed metoprolol succinate PRN.  Patient has not had these symptoms for several months and has not required this medication.  Reports that her mood is ok but she occasionally has feelings of guilt and wonders why this happened to her.  Open to having a discussion with behavioral health, but is concerned that this may reflect poorly on her in her chart.

## 2024-12-06 NOTE — PHYSICAL EXAM
[No Acute Distress] : no acute distress [Well Nourished] : well nourished [Well Developed] : well developed [Well-Appearing] : well-appearing [Normal Voice/Communication] : normal voice/communication [Normal Sclera/Conjunctiva] : normal sclera/conjunctiva [PERRL] : pupils equal round and reactive to light [EOMI] : extraocular movements intact [Normal Outer Ear/Nose] : the outer ears and nose were normal in appearance [Normal Oropharynx] : the oropharynx was normal [Normal TMs] : both tympanic membranes were normal [No Lymphadenopathy] : no lymphadenopathy [Supple] : supple [No Respiratory Distress] : no respiratory distress  [No Accessory Muscle Use] : no accessory muscle use [Clear to Auscultation] : lungs were clear to auscultation bilaterally [Normal Rate] : normal rate  [Regular Rhythm] : with a regular rhythm [Normal S1, S2] : normal S1 and S2 [No Murmur] : no murmur heard [No Edema] : there was no peripheral edema [No Extremity Clubbing/Cyanosis] : no extremity clubbing/cyanosis [No Axillary Lymphadenopathy] : no axillary lymphadenopathy [Soft] : abdomen soft [Non Tender] : non-tender [Non-distended] : non-distended [No Masses] : no abdominal mass palpated [Normal Bowel Sounds] : normal bowel sounds [Normal Supraclavicular Nodes] : no supraclavicular lymphadenopathy [Normal Axillary Nodes] : no axillary lymphadenopathy [Normal Posterior Cervical Nodes] : no posterior cervical lymphadenopathy [Normal Anterior Cervical Nodes] : no anterior cervical lymphadenopathy [No CVA Tenderness] : no CVA  tenderness [No Spinal Tenderness] : no spinal tenderness [Grossly Normal Strength/Tone] : grossly normal strength/tone [Coordination Grossly Intact] : coordination grossly intact [No Focal Deficits] : no focal deficits [Normal Gait] : normal gait [Speech Grossly Normal] : speech grossly normal [Normal Affect] : the affect was normal [Alert and Oriented x3] : oriented to person, place, and time [Normal Insight/Judgement] : insight and judgment were intact [No Joint Swelling] : no joint swelling [de-identified] : No tenderness to palpation, full range of motion [de-identified] : Port wine stain over right eye

## 2024-12-06 NOTE — HEALTH RISK ASSESSMENT
[No] : In the past 12 months have you used drugs other than those required for medical reasons? No [Little interest or pleasure doing things] : 1) Little interest or pleasure doing things [Feeling down, depressed, or hopeless] : 2) Feeling down, depressed, or hopeless [1] : 2) Feeling down, depressed, or hopeless for several days (1) [PHQ-2 Negative - No further assessment needed] : PHQ-2 Negative - No further assessment needed [Patient reported mammogram was abnormal] : Patient reported mammogram was abnormal [Patient reported PAP Smear was normal] : Patient reported PAP Smear was normal [Never] : Never [0-4] : 0-4 [[id number]] : 2 [MammogramDate] : 04/24 [MammogramComments] : DCIS [PapSmearDate] : 3/2024 [PapSmearComments] : Per patient report

## 2024-12-06 NOTE — ASSESSMENT
[FreeTextEntry1] : Ms Teri Quintanilla is a 29 year old female with a history of poorly differentiated DCIS (1/2024) s/p chemotherapy and bilateral mastectomy (10/2024) presenting as a NPA  #HCM - Up to date on mammogram - Up to date on Pap  - F/u in 6 months  - D/w Dr Lange

## 2024-12-06 NOTE — REVIEW OF SYSTEMS
[Vision Problems] : vision problems [Joint Pain] : joint pain [Joint Stiffness] : joint stiffness [Muscle Pain] : muscle pain [Fever] : no fever [Hearing Loss] : no hearing loss [Palpitations] : no palpitations [Shortness Of Breath] : no shortness of breath [Wheezing] : no wheezing [Dyspnea on Exertion] : no dyspnea on exertion [Abdominal Pain] : no abdominal pain [Nausea] : no nausea [Constipation] : no constipation [Diarrhea] : diarrhea [Vomiting] : no vomiting [Dysuria] : no dysuria [FreeTextEntry5] : Occasional chest pain for many years

## 2025-01-10 NOTE — END OF VISIT
[] : Fellow [FreeTextEntry3] : Patient seen with Dr Santos. 29 year old with bone pain and arthralgia, upper femur and knees started after beginning exemistine for breast cancer. Most likely side effect of treatment. For imaging, DEXA, check VitD. Can consider duloxetine if PT and changing drug doesnt help.

## 2025-01-10 NOTE — PHYSICAL EXAM
[General Appearance - Alert] : alert [General Appearance - In No Acute Distress] : in no acute distress [Extraocular Movements] : extraocular movements were intact [Oropharynx] : the oropharynx was normal [Exaggerated Use Of Accessory Muscles For Inspiration] : no accessory muscle use [Auscultation Breath Sounds / Voice Sounds] : lungs were clear to auscultation bilaterally [Heart Sounds] : normal S1 and S2 [Murmurs] : no murmurs [Edema] : there was no peripheral edema [Abdomen Tenderness] : non-tender [Cervical Lymph Nodes Enlarged Posterior Bilaterally] : posterior cervical [Cervical Lymph Nodes Enlarged Anterior Bilaterally] : anterior cervical [No Spinal Tenderness] : no spinal tenderness [Musculoskeletal - Swelling] : no joint swelling seen [Motor Tone] : muscle strength and tone were normal [] : no rash [No Focal Deficits] : no focal deficits [Oriented To Time, Place, And Person] : oriented to person, place, and time [FreeTextEntry1] : birth yari on face

## 2025-01-10 NOTE — HISTORY OF PRESENT ILLNESS
[Arthralgias] : arthralgias [FreeTextEntry1] : 29F PMH L breast DCIS s/p chemo and b/l masectomy now on exemestane referred to rheumatology for joint pains  #b/l knee and lateral hip pain -Pt was on chemotherapy (adriamycin, cytoxan, neulasta then on taxol) May to August 2024, and now on exemestane since 10/2024 -Pt reports that knee pain started first in 11/2024, then with lateral hip pain. Pain is constant, no specific triggers, can be worsened with walking or standing for a long time or even at rest. Feels like a sharp sensation, the lateral hip side pain does not radiate down to the knees. No swelling of the joints -Has neuropathy since chemo, as well as alopecia, which is improving, and had oral ulcers during chemo -Has tried tylenol for pain with minimal relief, is on gabapentin PRN for hot flashes -Denies fevers, weight loss, new rash, sicca symptoms, CP, SOB, urinary changes  FH: mother with arthritis SH: denies smoking, occasional alcohol No recent travels Has 1 son [Anorexia] : no anorexia [Weight Loss] : no weight loss [Malaise] : no malaise [Fever] : no fever [Chills] : no chills [Fatigue] : no fatigue [Depression] : no depression [Malar Facial Rash] : no malar facial rash [Skin Lesions] : no lesions [Skin Nodules] : no skin nodules [Oral Ulcers] : no oral ulcers [Cough] : no cough [Dry Mouth] : no dry mouth [Dysphonia] : no dysphonia [Dysphagia] : no dysphagia [Shortness of Breath] : no shortness of breath [Chest Pain] : no chest pain [Joint Swelling] : no joint swelling [Joint Warmth] : no joint warmth [Joint Deformity] : no joint deformity [Decreased ROM] : no decreased range of motion [Morning Stiffness] : no morning stiffness [Falls] : no falls [Difficulty Standing] : no difficulty standing [Difficulty Walking] : no difficulty walking [Dyspnea] : no dyspnea [Myalgias] : no myalgias [Muscle Weakness] : no muscle weakness [Muscle Spasms] : no muscle spasms [Muscle Cramping] : no muscle cramping [Visual Changes] : no visual changes [Eye Pain] : no eye pain [Eye Redness] : no eye redness [Dry Eyes] : no dry eyes

## 2025-01-10 NOTE — ASSESSMENT
[FreeTextEntry1] : 29F PMH L breast DCIS s/p chemo and b/l mastectomy with reconstruction, presenting for joint pain  #b/l knee pain with lateral hip/side pain -Joint pain likely side effect from exemestane, given that joint pains started 1 month of starting medication. No synovitis on exam, no inflammatory arthritis. Less likely trochanteric bursitis given lack of tenderness on palpation, less likely IT band syndrome as the pain is not radiating from lateral leg -Will obtain XR knees and hips to evaluate for any bone infarcts -Will obtain vitamin D level and DEXA scan given hx of breast ca and on estrogen modulator -Trial of PT for 4-6 weeks for joint pains, can consider trial of duloxetine if not improving. Discussed with patient to discuss with heme onc if there is an alternative option to exemestane if possible  Case discussed with Dr Braulio Santos MD Rheumatology Fellow RTC 6 weeks

## 2025-01-13 NOTE — PHYSICAL EXAM
[de-identified] : BL breasts soft, incisions c/d/i - prineo removed no palpable collections; L NAC lateralized with slight improvement in NAC position

## 2025-01-13 NOTE — ASSESSMENT
[FreeTextEntry1] : MALKA RANDHAWA is a 28 y/o F referred by Dr. 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 Now s/p BL TE to implant exchange, R breast mastopexy and L breast capsulorraphy on Sven 3, 2025  - Continue with massage of BL upper poles - f.u in 2 weeks - no heavy lifting, no strenuous activities, avoid exercise; will determine if cleared for full activity at next visit - [facility] paperwork for mom completed during this visit

## 2025-01-13 NOTE — HISTORY OF PRESENT ILLNESS
[FreeTextEntry1] : MALKA RANDHAWA is a 30 y/o F referred by Dr. 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 Reyes's office for breast reconstruction tentatively scheduled for BL mastectomy and L SNLB. Was diagnosed at age 28 with L breast cancer, with bx confirming diagnosis. Grandmother on dad's side had ovarian cancer but denies any other known familial history. She received 7 rounds of chemotherapy most recently august 7th, says this was her last round. She is seeing a cardiologist for side effects of taxol, currently wears holter monitor, pending repeat visit/TTE results. Never smoker, one child, no other medications. Reports she is a 32C. She had previously seen a plastic surgeon at [facility] whom she does not recall the name of, who discussed with her reconstructive options. she would like implants at this time based on that conversation.  Interval hx (10/7/2024): Patient now POD5 b/l mx, L SLNB, b/l TE prepec with alloderm filled with 200cc saline. Doing well no complaints. Wearing surgical bra. Recording drain outputs. Pain controlled without narcotics. Completing abx.  Interval hx (10/14/2024): Patient now POD12 b/l mx, L SLNB, b/l TE prepec with alloderm filled with 200cc saline. Doing well without complaints and wearing surgical bra. Sponge bathing to keep dressings dry. Drain outputs have been about 70 cc/24 hours for L DENNIS and 40 cc/24 hours for R DENNIS. she denies pain, fevers, chills, malaise.  Interval hx (10/21/2024): Patient now POD19 s/p BL mx, L SLNB, BL TE prepec with alloderm. Currently with 200cc saline from OR. No issues except some muscle discomfort from the left superior TE tab attachment. Has not been showering. Drain outputs low b/l, removed in clinic.  Interval hx (10/28/2024): Patient now POD26 s/p BL mx, L SLNB, BL TE prepec with alloderm. Currently with 300cc saline. no issues. showering. no erythema. no discharge. no swelling. L breast larger than Right. Filled in clinic 10/28 w/ 100 cc bilaterally for a total of 400  Interval hx (11/04/24): Patient now POD33 s/p BL mx, L SLNB, BL TE prepec with alloderm. Currently with 400cc saline. Patient doing well. No fevers, chills. Notes L arm pain and is starting some massage therapy. Notes intermittent medial chest pain, but seems to come and go without any provocation. Filled in clinic with 100 cc bilaterally for a total of 500cc.  Interval hx (11/11/24): Patient s/p BL mx w/ SLNB L, B/L TE prepec with alloderm. Currently happy with her size at 500cc filled bilaterally. Interested in booking for TE exchange for implants in early December or potentially sooner.  Interval hx (1/13/25): Patient is now s/p TE to implant exchange, R breast mastopexy and L breast capsulorraphy. Doing well. Recovering appropriately. Only reports some discomfort at L breast superior pole where she feels a small "ball".

## 2025-01-16 NOTE — PHYSICAL EXAM
[Normocephalic] : normocephalic [Atraumatic] : atraumatic [Supple] : supple [No Supraclavicular Adenopathy] : no supraclavicular adenopathy [No Cervical Adenopathy] : no cervical adenopathy [Examined in the supine and seated position] : examined in the supine and seated position [Symmetrical] : symmetrical [No Axillary Lymphadenopathy] : no left axillary lymphadenopathy [Full ROM] : full range of motion [No Rashes] : no rashes [No Ulceration] : no ulceration [de-identified] : Normal respiratory effort [de-identified] : bilateral nipples and post-mastectomy flaps are well-perfused and viable. No palpable mass or skin changes. no ecchymosis or erythema.  no axillary fullness.  no clinical lymphadenopathy.     [de-identified] : No lymphedema

## 2025-01-16 NOTE — RESULTS/DATA
[FreeTextEntry1] : BREAST PATH/RAD REVIEW.  [facility] Radiology.  3/1/24 BL Dx US: Birads 4.  - Indeterminate L 11:00 1.8 cm hypoechoic mass at area of (palp) concern (Rec US guided bx).  - L 12:30N1 0.5 cm hypoechoic lesion, probably benign (Rec 6-month f/u L Dx US if above ^ core bx is benign).  - No sonographic findings over R 4:00-6:00 additional region of (palp) concern. (Rec clinical f/u).  - Multiple b/l breast cysts (R up to 1.9 cm and L up to 0.8 cm)   3/19/24 Left [11N8] US guided core bx ([facility]): IDC (G3/poorly diff). DCIS (G3/ High grade) extends into the lobules. ER 0%, CT 31-40%, Ki-67 90%, HER2 (1+) Negative. Hemant Clip. Concordant.  (Rec Surg C/S, BL Dx MG is due at this time, given malignant core bx results a US guided bx of Left 12:30N1 lesion is now recommended).   4/3/24 Slide Review:  Left 11N8 core bx: Infiltrating poorly differentiated ductal carcinoma. High-grade DCIS. Receptors per outside report.   4/3/24 Breast MRI: BIRADS 6.  - R breast w/o suspicious enhancement.  - L UIQ 2.4 cm known large IDC.  - L 12:00 w/o suspicious findings corresponding to area for which biopsy was previously recommended; there are cysts in this region (Rec repeat US of 12-1:00 axis; possible bx)  - L 0.8 cm prominent low-lying L axillary LN which may correspond to (palp) finding (Rec Dx US w/ possible US guided bx).   4/5/24 B/L Dx MG/ L US: Birads 6. Extremely dense.  - Palpable Left superior slightly medial IDC  - L scattered breast cysts including one in the L UOQ measuring 0.8 cm. No suspicious findings at L 12-1:00.  - L 11N8 mass at site of known IDC to be 2.4 cm.  - L prominent LN w/ cortex measuring 0.5 cm (Rec US guided bx)   4/5/24 Left [axillary] US guided core bx: Reactive LN. Twirl Clip. Benign and concordant. (Rec Surg C/S).   8/28/24 Breast MRI s/p NAC: birads 6.  - R 9:00 0.6 cm inflamed cyst.  - L posterior UIQ bx clip corresponding to site of bx-proven cancer w/ interval resolution of previously noted 2.4 cm enhancing mass.  - L bx clip within small axillary LN, minimally decreased in size compared w/ prior study c/w bx-proven benign LN.   10/2/24 s/p BL NSM, L SLNB w/ TE's. ypT0N0  - Left mastectomy: No residual tumor identified s/p NAC. Tumor bed measures 2.2 x 1.5 x 0.8 cm. 0/10 Lymph nodes negative for carcinoma.   - Right mastectomy: FCC.

## 2025-01-16 NOTE — HISTORY OF PRESENT ILLNESS
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 Here for post-up check.   Left breast cancer diagnosed at age 28. 2024 Patient reports feeling a lump in her left breast. She presented to her GYN's office who started workup.  3/19/24 Left 11N8 core bx revealed poorly differentiated IDC, ER 0%, RI 31-40%, HER-2 negative, Ki-67 90%. High grade DCIS.  24 Left axillary core bx of prominent LN revealed a reactive lymph node. 2024 She was seen for fertility preservation and underwent ovarian stimulation on  with egg retrieval on 24.  24 Neoadjuvant chemotherapy started with dd- Adriamycin & Cytoxan with Neulasta x 4 cycles to be followed by Taxol x 4 cycles q 2 weeks. After two cycles of AC at [facility] ([facility]) she requested to transfer care to [facility] which is closer to home.  24 S/p Taxol C1  24 s/p Taxol C2.  24 Accompanied by her mother. c/o diffuse rash over fingertips and face after latest dose of Taxol. Projected end date of chemotherapy is 24.  24 Met with Cardiology for episodes of near syncope during NAC associated with low blood pressure - plan to repeat echo. Holter monitor placed to monitor for PACs and eval of possible arrhythmia.  24- The last Taxol was withheld due to the exacerbation of pruritus following each treatment. The patient was in the ED two days ago because of severe itching.  9/3/24 Patient presents for surgical planning. Since last visit has completed NAC on 24 - Breast MRI done showing complete treatment response. She also met with plastics clinic on 24 to discuss reconstruction options. Current plan is for staged TE-implant reconstruction.  10/2/24 SURGERY:   BL NSM, L SLNB w/ TE's - CTR. no residual left breast tumor identified. 0/10 LN negative for cancer.  10/17/24 POD#15 pain well controlled.  good ROM.  bilateral drains intact.  no fevers or chills.  1/3/25 s/p TE to implant exchange, R breast mastopexy and L breast capsulorraphy (plastics clinic) 24 Here for follow up.   Started exemestane 4 weeks ago.  has developed knee pain and is seeing a rheumatologist and will be starting PT for the knee.    No PMHx.  PSHx: Lap CCY, TMJ surgery - Reports delayed awaking after surgery.  Meds:   exemestane, leupron NKDA Family Hx: Ovarian cancer (Paternal grandmother). Colon Cancer (Paternal aunt). Prostate cancer (paternal uncle).  GYN:  Occupation: Housewife, Mom  Social: Smoking:  Never       ETOH: Rarely consumes alcohol.

## 2025-01-16 NOTE — ASSESSMENT
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 Here for follow up.   Age at Presentation: 28 Procedure:  BL NSM, L SLNB w/ TE's on 10/2/24 Pathology: no residual left breast tumor s/p NAC; 0/10 LN Genetics: Negative (4/6/24, [place])  Stage:  ypT0 N0  We discussed her clinical breast exam today is unremarkable with no clinical evidence of disease.   - PT/Lymphedema clinic - Left arm cording has resolved with therapy.  no lymphedema.   - Medical oncology (Following w/ Dr. Blakely). Completed NAC on 8/7/24.  Currently receiving ovarian suppression and exemestane.   c/o joint pains.   following with rheum and PT.   - Following w/ Plastics Clinic  - Seen by Genetic counseling.  - RTO 6 months.

## 2025-01-30 NOTE — HISTORY OF PRESENT ILLNESS
[Disease: _____________________] : Disease: [unfilled] [T: ___] : T[unfilled] [N: ___] : N[unfilled] [M: ___] : M[unfilled] [AJCC Stage: ____] : AJCC Stage: [unfilled] [de-identified] : Left breast cancer diagnosed at age 28. The patient felt a lump in her left breast in 1/2024. She presented to her GYN's office. 3/1/24 Bilateral ultrasound identified a 1.8 cm mass in the left breast at 11:00. No abnormal axillary lymph nodes identified. 3/19/24 Left breast core biopsy revealed infiltrating poorly differentiated ductal carcinoma, ER negative (0%), [place] 31-40%, HER-2 negative, Ki-67 90%. Also noted was DCIS, high-grade, solid type. 4/3/24 Breast MRI revealed no suspicious enhancement in the right breast and negative for right axillary adenopathy. Evaluation of the left breast demonstrates known large invasive ductal carcinoma in the upper/inner quadrant measuring 2.4 cm. There is no suspicious finding at the 12:00 axis of the left breast to correspond to the finding on sonogram for which biopsy was previously recommended; there are cysts in this region and repeat ultrasound is recommended. There is a prominent low-lying axillary lymph node measuring 8 mm which may correspond to the palpable finding and targeted ultrasound to potentially guide biopsy is recommended. 4/5/24 Bilateral diagnostic mammogram and sonogram revealed extremely dense breasts which lowers the sensitivity of mammography. Evaluation of the left breast demonstrates a mass far posteriorly superiorly containing clip from prior biopsy, corresponding to known carcinoma measuring 2.3 x 2.4 cm. There is a prominent lymph node in left axilla with cortex measuring 5 mm for which ultrasound-guided biopsy is recommended. No suspicious calcifications. Evaluation of the right breast demonstrates no suspicious finding. 4/5/24 Core biopsy of left axillary lymph node revealed reactive lymph node. 4/2024 She was seen for fertility preservation and underwent ovarian stimulation on 4/19 with egg retrieval on 5/1/24 without complication. 5/2/24 Echocardiogram revealed normal LV and RV size and function with LVEF = 64% and LV Global longitudinal strain -22% (normal < -19%). 5/7/24 Mediport placement with tip of catheter in right atrium and approved for use. 5/8/24 - 8/21/24 Neoadjuvant chemotherapy with dose dense Adriamycin (60 mg/m2) & Cytoxan (600 mg/m2) with Neulasta x 4 cycles followed by Taxol (175 mg/m2) x 4 cycles, given every 2 weeks. After two cycles of AC at [facility]) she requested to transfer care to [facility] [place] which is closer to home. 10/2/24 Bilateral nipple sparing mastectomies with Dr. Reyes and tissue expander reconstruction by Dr. Byrd.      Left breast showed no residual tumor.  Tumor bed measured 2.2 x 1.5 x 0.8 cm. 0/10 SLN.      Right breast showed fibrocystic changes 11/5/24 Lupron 22.5 mg every 3 months with exemestane 25 mg daily.    [de-identified] : Clinically ~2 cm IDC, LN negative on biopsy, ER 0%, [place] 31-40%, HER-2 negative, Ki-67 90% [de-identified] : Teri completed chemotherapy in  with the last cycle of Taxol held due to pruritus c/w drug intolerance. She had bilateral mastectomies on 10/2/24 which revealed no residual disease. She started Lupron and Exemestane in 2024 and has been tolerated it well. However, after a few weeks on the medication she began experiencing significant joint pain, particularly in knees, hands and back with pain scale of 7-8/10. This pain makes it difficult for her to rise from seated position and to open bottles and jars. She reports seeing a rheumatologist, but the work up was essentially negative. she was informed these side effects including hot flashes are related to the medication and that sometime even switching to another AI may not provide much relief.  The hot flashes reports she gets both during the day and night, but more intense and frequent at bedtime She has been taking Gabapentin 300mg QHS which she takes only occasionally as it makes her very groggy, and she finds it hard to care for her 4-year-old son. hot flashes more at nights and during the day too LMP 24. She is getting hot flashes which do wake her up at night.  HEALTH MAINTENANCE: PCP: Dr. Nitza Murray in [place] GYN: Dr. Fox Ivy in [place] Mammogram & sonogram: s/p bilateral mastectomies Pap Smear:  negative Bone density: 25 showed T scores of -0.1 in spine, -1.6 in left femoral neck and -0.7 in left total hip Genetic testin24 [employer] 21 gene panel with no clinically significant variants detected Family History: She has a family history of ovarian cancer in her paternal grandmother; she has a paternal aunt who was diagnosed with colon cancer; she is a paternal uncle who was diagnosed with prostate cancer.  No family risk of undergoing genetic testing.  There is no family history of breast cancer.

## 2025-01-30 NOTE — PHYSICAL EXAM
[Fully active, able to carry on all pre-disease performance without restriction] : Status 0 - Fully active, able to carry on all pre-disease performance without restriction [Normal] : affect appropriate [de-identified] : Bilateral nipple sparing mastectomies with implant reconstructions. Left floating breast implant with left nipple malposition [de-identified] : Fine macular rash on arms

## 2025-01-30 NOTE — PHYSICAL EXAM
[Fully active, able to carry on all pre-disease performance without restriction] : Status 0 - Fully active, able to carry on all pre-disease performance without restriction [Normal] : affect appropriate [de-identified] : Bilateral nipple sparing mastectomies with implant reconstructions. Left floating breast implant with left nipple malposition [de-identified] : Fine macular rash on arms

## 2025-01-30 NOTE — HISTORY OF PRESENT ILLNESS
[Disease: _____________________] : Disease: [unfilled] [T: ___] : T[unfilled] [N: ___] : N[unfilled] [M: ___] : M[unfilled] [AJCC Stage: ____] : AJCC Stage: [unfilled] [de-identified] : Left breast cancer diagnosed at age 28. The patient felt a lump in her left breast in 1/2024. She presented to her GYN's office. 3/1/24 Bilateral ultrasound identified a 1.8 cm mass in the left breast at 11:00. No abnormal axillary lymph nodes identified. 3/19/24 Left breast core biopsy revealed infiltrating poorly differentiated ductal carcinoma, ER negative (0%), [place] 31-40%, HER-2 negative, Ki-67 90%. Also noted was DCIS, high-grade, solid type. 4/3/24 Breast MRI revealed no suspicious enhancement in the right breast and negative for right axillary adenopathy. Evaluation of the left breast demonstrates known large invasive ductal carcinoma in the upper/inner quadrant measuring 2.4 cm. There is no suspicious finding at the 12:00 axis of the left breast to correspond to the finding on sonogram for which biopsy was previously recommended; there are cysts in this region and repeat ultrasound is recommended. There is a prominent low-lying axillary lymph node measuring 8 mm which may correspond to the palpable finding and targeted ultrasound to potentially guide biopsy is recommended. 4/5/24 Bilateral diagnostic mammogram and sonogram revealed extremely dense breasts which lowers the sensitivity of mammography. Evaluation of the left breast demonstrates a mass far posteriorly superiorly containing clip from prior biopsy, corresponding to known carcinoma measuring 2.3 x 2.4 cm. There is a prominent lymph node in left axilla with cortex measuring 5 mm for which ultrasound-guided biopsy is recommended. No suspicious calcifications. Evaluation of the right breast demonstrates no suspicious finding. 4/5/24 Core biopsy of left axillary lymph node revealed reactive lymph node. 4/2024 She was seen for fertility preservation and underwent ovarian stimulation on 4/19 with egg retrieval on 5/1/24 without complication. 5/2/24 Echocardiogram revealed normal LV and RV size and function with LVEF = 64% and LV Global longitudinal strain -22% (normal < -19%). 5/7/24 Mediport placement with tip of catheter in right atrium and approved for use. 5/8/24 - 8/21/24 Neoadjuvant chemotherapy with dose dense Adriamycin (60 mg/m2) & Cytoxan (600 mg/m2) with Neulasta x 4 cycles followed by Taxol (175 mg/m2) x 4 cycles, given every 2 weeks. After two cycles of AC at [facility]) she requested to transfer care to [facility] which is closer to home. 10/2/24 Bilateral nipple sparing mastectomies with Dr. Reyes and tissue expander reconstruction by Dr. Byrd.      Left breast showed no residual tumor.  Tumor bed measured 2.2 x 1.5 x 0.8 cm. 0/10 SLN.      Right breast showed fibrocystic changes 11/5/24 Lupron 22.5 mg every 3 months with exemestane 25 mg daily.    [de-identified] : Clinically ~2 cm IDC, LN negative on biopsy, ER 0%, [place] 31-40%, HER-2 negative, Ki-67 90% [de-identified] : Teri completed chemotherapy in  with the last cycle of Taxol held due to pruritus c/w drug intolerance. She had bilateral mastectomies on 10/2/24 which revealed no residual disease. She started Lupron and Exemestane in 2024 and has been tolerated it well. However, after a few weeks on the medication she began experiencing significant joint pain, particularly in knees, hands and back with pain scale of 7-8/10. This pain makes it difficult for her to rise from seated position and to open bottles and jars. She reports seeing a rheumatologist, but the work up was essentially negative. she was informed these side effects including hot flashes are related to the medication and that sometime even switching to another AI may not provide much relief.  The hot flashes reports she gets both during the day and night, but more intense and frequent at bedtime She has been taking Gabapentin 300mg QHS which she takes only occasionally as it makes her very groggy, and she finds it hard to care for her 4-year-old son. hot flashes more at nights and during the day too LMP 24. She is getting hot flashes which do wake her up at night.  HEALTH MAINTENANCE: PCP: Dr. Nitza Murray in [place] GYN: Dr. Fox Ivy in [place] Mammogram & sonogram: s/p bilateral mastectomies Pap Smear:  negative Bone density: 25 showed T scores of -0.1 in spine, -1.6 in left femoral neck and -0.7 in left total hip Genetic testin24 [employer] 21 gene panel with no clinically significant variants detected Family History: She has a family history of ovarian cancer in her paternal grandmother; she has a paternal aunt who was diagnosed with colon cancer; she is a paternal uncle who was diagnosed with prostate cancer.  No family risk of undergoing genetic testing.  There is no family history of breast cancer.

## 2025-01-30 NOTE — HISTORY OF PRESENT ILLNESS
[Disease: _____________________] : Disease: [unfilled] [T: ___] : T[unfilled] [N: ___] : N[unfilled] [M: ___] : M[unfilled] [AJCC Stage: ____] : AJCC Stage: [unfilled] [de-identified] : Left breast cancer diagnosed at age 28. The patient felt a lump in her left breast in 1/2024. She presented to her GYN's office. 3/1/24 Bilateral ultrasound identified a 1.8 cm mass in the left breast at 11:00. No abnormal axillary lymph nodes identified. 3/19/24 Left breast core biopsy revealed infiltrating poorly differentiated ductal carcinoma, ER negative (0%), [place] 31-40%, HER-2 negative, Ki-67 90%. Also noted was DCIS, high-grade, solid type. 4/3/24 Breast MRI revealed no suspicious enhancement in the right breast and negative for right axillary adenopathy. Evaluation of the left breast demonstrates known large invasive ductal carcinoma in the upper/inner quadrant measuring 2.4 cm. There is no suspicious finding at the 12:00 axis of the left breast to correspond to the finding on sonogram for which biopsy was previously recommended; there are cysts in this region and repeat ultrasound is recommended. There is a prominent low-lying axillary lymph node measuring 8 mm which may correspond to the palpable finding and targeted ultrasound to potentially guide biopsy is recommended. 4/5/24 Bilateral diagnostic mammogram and sonogram revealed extremely dense breasts which lowers the sensitivity of mammography. Evaluation of the left breast demonstrates a mass far posteriorly superiorly containing clip from prior biopsy, corresponding to known carcinoma measuring 2.3 x 2.4 cm. There is a prominent lymph node in left axilla with cortex measuring 5 mm for which ultrasound-guided biopsy is recommended. No suspicious calcifications. Evaluation of the right breast demonstrates no suspicious finding. 4/5/24 Core biopsy of left axillary lymph node revealed reactive lymph node. 4/2024 She was seen for fertility preservation and underwent ovarian stimulation on 4/19 with egg retrieval on 5/1/24 without complication. 5/2/24 Echocardiogram revealed normal LV and RV size and function with LVEF = 64% and LV Global longitudinal strain -22% (normal < -19%). 5/7/24 Mediport placement with tip of catheter in right atrium and approved for use. 5/8/24 - 8/21/24 Neoadjuvant chemotherapy with dose dense Adriamycin (60 mg/m2) & Cytoxan (600 mg/m2) with Neulasta x 4 cycles followed by Taxol (175 mg/m2) x 4 cycles, given every 2 weeks. After two cycles of AC at [facility]) she requested to transfer care to [facility] which is closer to home. 10/2/24 Bilateral nipple sparing mastectomies with Dr. Reyes and tissue expander reconstruction by Dr. Byrd.      Left breast showed no residual tumor.  Tumor bed measured 2.2 x 1.5 x 0.8 cm. 0/10 SLN.      Right breast showed fibrocystic changes 11/5/24 Lupron 22.5 mg every 3 months with exemestane 25 mg daily.    [de-identified] : Clinically ~2 cm IDC, LN negative on biopsy, ER 0%, [place] 31-40%, HER-2 negative, Ki-67 90% [de-identified] : Teri completed chemotherapy in  with the last cycle of Taxol held due to pruritus c/w drug intolerance. She had bilateral mastectomies on 10/2/24 which revealed no residual disease. She started Lupron and Exemestane in 2024 and has been tolerated it well. However, after a few weeks on the medication she began experiencing significant joint pain, particularly in knees, hands and back with pain scale of 7-8/10. This pain makes it difficult for her to rise from seated position and to open bottles and jars. She reports seeing a rheumatologist, but the work up was essentially negative. she was informed these side effects including hot flashes are related to the medication and that sometime even switching to another AI may not provide much relief.  The hot flashes reports she gets both during the day and night, but more intense and frequent at bedtime She has been taking Gabapentin 300mg QHS which she takes only occasionally as it makes her very groggy, and she finds it hard to care for her 4-year-old son. hot flashes more at nights and during the day too LMP 24. She is getting hot flashes which do wake her up at night.  HEALTH MAINTENANCE: PCP: Dr. Nitza Murray in [place] GYN: Dr. Fox Ivy in [place] Mammogram & sonogram: s/p bilateral mastectomies Pap Smear:  negative Bone density: 25 showed T scores of -0.1 in spine, -1.6 in left femoral neck and -0.7 in left total hip Genetic testin24 [employer] 21 gene panel with no clinically significant variants detected Family History: She has a family history of ovarian cancer in her paternal grandmother; she has a paternal aunt who was diagnosed with colon cancer; she is a paternal uncle who was diagnosed with prostate cancer.  No family risk of undergoing genetic testing.  There is no family history of breast cancer.

## 2025-01-30 NOTE — PHYSICAL EXAM
[Fully active, able to carry on all pre-disease performance without restriction] : Status 0 - Fully active, able to carry on all pre-disease performance without restriction [Normal] : affect appropriate [de-identified] : Bilateral nipple sparing mastectomies with implant reconstructions. Left floating breast implant with left nipple malposition [de-identified] : Fine macular rash on arms

## 2025-02-28 NOTE — HISTORY OF PRESENT ILLNESS
[Arthralgias] : arthralgias [FreeTextEntry1] : 29F PMH L breast DCIS s/p chemo and b/l masectomy now on exemestane referred to rheumatology for joint pains  #b/l knee and lateral hip pain -Pt was on chemotherapy (adriamycin, cytoxan, neulasta then on taxol) May to August 2024, and now on exemestane since 10/2024 -Pt reports that knee pain started first in 11/2024, then with lateral hip pain. Pain is constant, no specific triggers, can be worsened with walking or standing for a long time or even at rest. Feels like a sharp sensation, the lateral hip side pain does not radiate down to the knees. No swelling of the joints -Has neuropathy since chemo, as well as alopecia, which is improving, and had oral ulcers during chemo -Has tried tylenol for pain with minimal relief, is on gabapentin PRN for hot flashes -Denies fevers, weight loss, new rash, sicca symptoms, CP, SOB, urinary changes  FH: mother with arthritis SH: denies smoking, occasional alcohol No recent travels Has 1 son  2/28/25: Has been doing PT for past month for 2 times per week. Has switched exemestane to anastrazole, and has been taking anastrazole for 2 weeks. Feels joint pains are about 50% improved, but some days the pain is the same as before. Hip pain has resolved, but has b/l frontal knee pain, clyde with activity and stairs or standing up after sitting awhile. Has ankle pain in the am when she gets out of bed and improves after an hour. Has some residual numbness in hands. Hot flashes are worse on anastrazole, is planning on starting celexa. [Anorexia] : no anorexia [Weight Loss] : no weight loss [Malaise] : no malaise [Fever] : no fever [Chills] : no chills [Fatigue] : no fatigue [Depression] : no depression [Malar Facial Rash] : no malar facial rash [Skin Lesions] : no lesions [Skin Nodules] : no skin nodules [Oral Ulcers] : no oral ulcers [Cough] : no cough [Dry Mouth] : no dry mouth [Dysphonia] : no dysphonia [Dysphagia] : no dysphagia [Shortness of Breath] : no shortness of breath [Chest Pain] : no chest pain [Joint Swelling] : no joint swelling [Joint Warmth] : no joint warmth [Joint Deformity] : no joint deformity [Decreased ROM] : no decreased range of motion [Morning Stiffness] : no morning stiffness [Falls] : no falls [Difficulty Standing] : no difficulty standing [Difficulty Walking] : no difficulty walking [Dyspnea] : no dyspnea [Myalgias] : no myalgias [Muscle Weakness] : no muscle weakness [Muscle Spasms] : no muscle spasms [Muscle Cramping] : no muscle cramping [Visual Changes] : no visual changes [Eye Pain] : no eye pain [Eye Redness] : no eye redness [Dry Eyes] : no dry eyes

## 2025-02-28 NOTE — ASSESSMENT
[FreeTextEntry1] : 29F PMH L breast DCIS s/p chemo and b/l mastectomy with reconstruction, presenting for joint pain  #b/l knee pain with lateral hip/side pain -Joint pain likely side effect from exemestane, given that joint pains started 1 month of starting medication. No synovitis on exam, no inflammatory arthritis. Joint pain improving after PT. Also switched from exemestane to anastrazole 2 weeks ago. Hip pain resolved, knee pain 50% better. May also have component of patellofemoral syndrome, which PT will help as well -XR knees and hips normal -Discussed with pt to c/w PT and to do PT exercises at home on days she doesn't go to PT  #vitamin D deficiency -Low vit D 19.7, c/w supplements  -DEXA normal  Case discussed with Dr Berrios RTC 3 months Christina Santos MD Rheumatology Fellow

## 2025-03-03 NOTE — HISTORY OF PRESENT ILLNESS
[FreeTextEntry1] : MALAK RANDHAWA is a 28 y/o F referred by Dr. 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 Reyes's office for breast reconstruction tentatively scheduled for BL mastectomy and L SNLB. Was diagnosed at age 28 with L breast cancer, with bx confirming diagnosis. Grandmother on dad's side had ovarian cancer but denies any other known familial history. She received 7 rounds of chemotherapy most recently august 7th, says this was her last round. She is seeing a cardiologist for side effects of taxol, currently wears holter monitor, pending repeat visit/TTE results. Never smoker, one child, no other medications. Reports she is a 32C. She had previously seen a plastic surgeon at [facility] whom she does not recall the name of, who discussed with her reconstructive options. she would like implants at this time based on that conversation.  Interval hx (10/7/2024): Patient now POD5 b/l mx, L SLNB, b/l TE prepec with alloderm filled with 200cc saline. Doing well no complaints. Wearing surgical bra. Recording drain outputs. Pain controlled without narcotics. Completing abx.  Interval hx (10/14/2024): Patient now POD12 b/l mx, L SLNB, b/l TE prepec with alloderm filled with 200cc saline. Doing well without complaints and wearing surgical bra. Sponge bathing to keep dressings dry. Drain outputs have been about 70 cc/24 hours for L DENNIS and 40 cc/24 hours for R DENNIS. she denies pain, fevers, chills, malaise.  Interval hx (10/21/2024): Patient now POD19 s/p BL mx, L SLNB, BL TE prepec with alloderm. Currently with 200cc saline from OR. No issues except some muscle discomfort from the left superior TE tab attachment. Has not been showering. Drain outputs low b/l, removed in clinic.  Interval hx (10/28/2024): Patient now POD26 s/p BL mx, L SLNB, BL TE prepec with alloderm. Currently with 300cc saline. no issues. showering. no erythema. no discharge. no swelling. L breast larger than Right. Filled in clinic 10/28 w/ 100 cc bilaterally for a total of 400  Interval hx (11/04/24): Patient now POD33 s/p BL mx, L SLNB, BL TE prepec with alloderm. Currently with 400cc saline. Patient doing well. No fevers, chills. Notes L arm pain and is starting some massage therapy. Notes intermittent medial chest pain, but seems to come and go without any provocation. Filled in clinic with 100 cc bilaterally for a total of 500cc.  Interval hx (11/11/24): Patient s/p BL mx w/ SLNB L, B/L TE prepec with alloderm. Currently happy with her size at 500cc filled bilaterally. Interested in booking for TE exchange for implants in early December or potentially sooner.  Interval hx (1/13/25): Patient is now s/p TE to implant exchange, R breast mastopexy and L breast capsulorraphy. Doing well. Recovering appropriately. Only reports some discomfort at L breast superior pole where she feels a small "ball". Interval hx (1/27/25): Pt feels well, denies fevers/chills/pain in breasts. Expresses concern regarding rippling particularly prominent in L medial breast. Also states she feels her implants are too small.  Interval hx 3/3/25: Doing well, two weeks ago noticed some leaking from R nipple - describes as milky appearing fluid. Discussed that this can be a normal non-pathologic finding, especially in women with history of prior pregnancies.  No other concerns. Well healed.

## 2025-03-03 NOTE — PHYSICAL EXAM
[NI] : Normal [de-identified] : BL breasts soft, incisions c/d/i, healing well. Bilateral breasts with slight visible rippling, most prominent in left superomedial breast. No palpable collections or masses

## 2025-03-03 NOTE — HISTORY OF PRESENT ILLNESS
[FreeTextEntry1] : MALKA RANDHAWA is a 28 y/o F referred by Dr. 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 Reyes's office for breast reconstruction tentatively scheduled for BL mastectomy and L SNLB. Was diagnosed at age 28 with L breast cancer, with bx confirming diagnosis. Grandmother on dad's side had ovarian cancer but denies any other known familial history. She received 7 rounds of chemotherapy most recently august 7th, says this was her last round. She is seeing a cardiologist for side effects of taxol, currently wears holter monitor, pending repeat visit/TTE results. Never smoker, one child, no other medications. Reports she is a 32C. She had previously seen a plastic surgeon at [facility] whom she does not recall the name of, who discussed with her reconstructive options. she would like implants at this time based on that conversation.  Interval hx (10/7/2024): Patient now POD5 b/l mx, L SLNB, b/l TE prepec with alloderm filled with 200cc saline. Doing well no complaints. Wearing surgical bra. Recording drain outputs. Pain controlled without narcotics. Completing abx.  Interval hx (10/14/2024): Patient now POD12 b/l mx, L SLNB, b/l TE prepec with alloderm filled with 200cc saline. Doing well without complaints and wearing surgical bra. Sponge bathing to keep dressings dry. Drain outputs have been about 70 cc/24 hours for L DENNIS and 40 cc/24 hours for R DENNIS. she denies pain, fevers, chills, malaise.  Interval hx (10/21/2024): Patient now POD19 s/p BL mx, L SLNB, BL TE prepec with alloderm. Currently with 200cc saline from OR. No issues except some muscle discomfort from the left superior TE tab attachment. Has not been showering. Drain outputs low b/l, removed in clinic.  Interval hx (10/28/2024): Patient now POD26 s/p BL mx, L SLNB, BL TE prepec with alloderm. Currently with 300cc saline. no issues. showering. no erythema. no discharge. no swelling. L breast larger than Right. Filled in clinic 10/28 w/ 100 cc bilaterally for a total of 400  Interval hx (11/04/24): Patient now POD33 s/p BL mx, L SLNB, BL TE prepec with alloderm. Currently with 400cc saline. Patient doing well. No fevers, chills. Notes L arm pain and is starting some massage therapy. Notes intermittent medial chest pain, but seems to come and go without any provocation. Filled in clinic with 100 cc bilaterally for a total of 500cc.  Interval hx (11/11/24): Patient s/p BL mx w/ SLNB L, B/L TE prepec with alloderm. Currently happy with her size at 500cc filled bilaterally. Interested in booking for TE exchange for implants in early December or potentially sooner.  Interval hx (1/13/25): Patient is now s/p TE to implant exchange, R breast mastopexy and L breast capsulorraphy. Doing well. Recovering appropriately. Only reports some discomfort at L breast superior pole where she feels a small "ball". Interval hx (1/27/25): Pt feels well, denies fevers/chills/pain in breasts. Expresses concern regarding rippling particularly prominent in L medial breast. Also states she feels her implants are too small.  Interval hx 3/3/25: Doing well, two weeks ago noticed some leaking from R nipple - describes as milky appearing fluid. Discussed that this can be a normal non-pathologic finding, especially in women with history of prior pregnancies.  No other concerns. Well healed.

## 2025-03-03 NOTE — PHYSICAL EXAM
[NI] : Normal [de-identified] : BL breasts soft, incisions c/d/i, healing well. Bilateral breasts with slight visible rippling, most prominent in left superomedial breast. No palpable collections or masses

## 2025-03-03 NOTE — ASSESSMENT
[FreeTextEntry1] : MALKA RANDHAWA is a 28 y/o F referred by Dr. 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 Now s/p BL TE to implant exchange, R breast mastopexy and L breast capsulorraphy on Sven 3, 2025  - Incisions healing well, can begin scar massage/silicone scar sheets - OK to increase activity level gradually to baseline levels - Plan for fat grafting around 6 months postop to address rippling - RTC 3 month.

## 2025-04-23 NOTE — HISTORY OF PRESENT ILLNESS
[Disease: _____________________] : Disease: [unfilled] [T: ___] : T[unfilled] [N: ___] : N[unfilled] [M: ___] : M[unfilled] [AJCC Stage: ____] : AJCC Stage: [unfilled] [de-identified] : Left breast cancer diagnosed at age 28. The patient felt a lump in her left breast in 1/2024. She presented to her GYN's office. 3/1/24 Bilateral ultrasound identified a 1.8 cm mass in the left breast at 11:00. No abnormal axillary lymph nodes identified. 3/19/24 Left breast core biopsy revealed infiltrating poorly differentiated ductal carcinoma, ER negative (0%), [place] 31-40%, HER-2 negative, Ki-67 90%. Also noted was DCIS, high-grade, solid type. 4/3/24 Breast MRI revealed no suspicious enhancement in the right breast and negative for right axillary adenopathy. Evaluation of the left breast demonstrates known large invasive ductal carcinoma in the upper/inner quadrant measuring 2.4 cm. There is no suspicious finding at the 12:00 axis of the left breast to correspond to the finding on sonogram for which biopsy was previously recommended; there are cysts in this region and repeat ultrasound is recommended. There is a prominent low-lying axillary lymph node measuring 8 mm which may correspond to the palpable finding and targeted ultrasound to potentially guide biopsy is recommended. 4/5/24 Bilateral diagnostic mammogram and sonogram revealed extremely dense breasts which lowers the sensitivity of mammography. Evaluation of the left breast demonstrates a mass far posteriorly superiorly containing clip from prior biopsy, corresponding to known carcinoma measuring 2.3 x 2.4 cm. There is a prominent lymph node in left axilla with cortex measuring 5 mm for which ultrasound-guided biopsy is recommended. No suspicious calcifications. Evaluation of the right breast demonstrates no suspicious finding. 4/5/24 Core biopsy of left axillary lymph node revealed reactive lymph node. 4/2024 She was seen for fertility preservation and underwent ovarian stimulation on 4/19 with egg retrieval on 5/1/24 without complication. 5/2/24 Echocardiogram revealed normal LV and RV size and function with LVEF = 64% and LV Global longitudinal strain -22% (normal < -19%). 5/7/24 Mediport placement with tip of catheter in right atrium and approved for use. 5/8/24 - 8/21/24 Neoadjuvant chemotherapy with dose dense Adriamycin (60 mg/m2) & Cytoxan (600 mg/m2) with Neulasta x 4 cycles followed by Taxol (175 mg/m2) x 4 cycles, given every 2 weeks. After two cycles of AC at [facility]) she requested to transfer care to [facility] which is closer to home. 10/2/24 Bilateral nipple sparing mastectomies with Dr. Reyes and tissue expander reconstruction by Dr. Byrd.      Left breast showed no residual tumor.  Tumor bed measured 2.2 x 1.5 x 0.8 cm. 0/10 SLN.      Right breast showed fibrocystic changes 11/5/24 Lupron 22.5 mg every 3 months  11/24 - 1/30/25 Exemestane 25 mg daily, discontinued due to arthralgias. 2/1/25 Anastrozole started    [de-identified] : Clinically ~2 cm IDC, LN negative on biopsy, ER 0%, [place] 31-40%, HER-2 negative, Ki-67 90% [de-identified] : Teri completed chemotherapy in  with the last cycle of Taxol held due to pruritus c/w drug intolerance. She had bilateral mastectomies on 10/2/24 which revealed no residual disease. She started Lupron and Exemestane in 2024, but after a few weeks developed significant joint pain, particularly in knees, hands and back. She changed to anastrozole on 25 and is tolerating it much better with only some residual pain in her knees. She is doing PT for her knees which is helpful. She continues to get hot flashes, both day and night but worse around bedtime. She tried gabapentin but did not tolerate it as it made her too groggy. She was offered Celexa but decided not take it and is just managing by keeping cool and using a fan.  HEALTH MAINTENANCE: PCP: Dr. Nitza Murray in [place] GYN: Dr. Fox Ivy in [place] Mammogram: s/p bilateral mastectomies Pap Smear:  negative Bone density: 25 showed T scores of -0.1 in spine, -1.6 in left femoral neck and -0.7 in left total hip Genetic testin24 [employer] 21 gene panel with no clinically significant variants detected Family History: She has a family history of ovarian cancer in her paternal grandmother; she has a paternal aunt who was diagnosed with colon cancer; she is a paternal uncle who was diagnosed with prostate cancer.  No family risk of undergoing genetic testing.  There is no family history of breast cancer.

## 2025-04-23 NOTE — PHYSICAL EXAM
[Fully active, able to carry on all pre-disease performance without restriction] : Status 0 - Fully active, able to carry on all pre-disease performance without restriction [Normal] : affect appropriate [de-identified] : Bilateral nipple sparing mastectomies with implant reconstructions. Left floating breast implant with left nipple malposition [de-identified] : Fine macular rash on arms

## 2025-04-23 NOTE — HISTORY OF PRESENT ILLNESS
[Disease: _____________________] : Disease: [unfilled] [T: ___] : T[unfilled] [N: ___] : N[unfilled] [M: ___] : M[unfilled] [AJCC Stage: ____] : AJCC Stage: [unfilled] [de-identified] : Left breast cancer diagnosed at age 28. The patient felt a lump in her left breast in 1/2024. She presented to her GYN's office. 3/1/24 Bilateral ultrasound identified a 1.8 cm mass in the left breast at 11:00. No abnormal axillary lymph nodes identified. 3/19/24 Left breast core biopsy revealed infiltrating poorly differentiated ductal carcinoma, ER negative (0%), [place] 31-40%, HER-2 negative, Ki-67 90%. Also noted was DCIS, high-grade, solid type. 4/3/24 Breast MRI revealed no suspicious enhancement in the right breast and negative for right axillary adenopathy. Evaluation of the left breast demonstrates known large invasive ductal carcinoma in the upper/inner quadrant measuring 2.4 cm. There is no suspicious finding at the 12:00 axis of the left breast to correspond to the finding on sonogram for which biopsy was previously recommended; there are cysts in this region and repeat ultrasound is recommended. There is a prominent low-lying axillary lymph node measuring 8 mm which may correspond to the palpable finding and targeted ultrasound to potentially guide biopsy is recommended. 4/5/24 Bilateral diagnostic mammogram and sonogram revealed extremely dense breasts which lowers the sensitivity of mammography. Evaluation of the left breast demonstrates a mass far posteriorly superiorly containing clip from prior biopsy, corresponding to known carcinoma measuring 2.3 x 2.4 cm. There is a prominent lymph node in left axilla with cortex measuring 5 mm for which ultrasound-guided biopsy is recommended. No suspicious calcifications. Evaluation of the right breast demonstrates no suspicious finding. 4/5/24 Core biopsy of left axillary lymph node revealed reactive lymph node. 4/2024 She was seen for fertility preservation and underwent ovarian stimulation on 4/19 with egg retrieval on 5/1/24 without complication. 5/2/24 Echocardiogram revealed normal LV and RV size and function with LVEF = 64% and LV Global longitudinal strain -22% (normal < -19%). 5/7/24 Mediport placement with tip of catheter in right atrium and approved for use. 5/8/24 - 8/21/24 Neoadjuvant chemotherapy with dose dense Adriamycin (60 mg/m2) & Cytoxan (600 mg/m2) with Neulasta x 4 cycles followed by Taxol (175 mg/m2) x 4 cycles, given every 2 weeks. After two cycles of AC at [facility]) she requested to transfer care to [facility] which is closer to home. 10/2/24 Bilateral nipple sparing mastectomies with Dr. Reyes and tissue expander reconstruction by Dr. Byrd.      Left breast showed no residual tumor.  Tumor bed measured 2.2 x 1.5 x 0.8 cm. 0/10 SLN.      Right breast showed fibrocystic changes 11/5/24 Lupron 22.5 mg every 3 months  11/24 - 1/30/25 Exemestane 25 mg daily, discontinued due to arthralgias. 2/1/25 Anastrozole started    [de-identified] : Clinically ~2 cm IDC, LN negative on biopsy, ER 0%, [place] 31-40%, HER-2 negative, Ki-67 90% [de-identified] : Teri completed chemotherapy in  with the last cycle of Taxol held due to pruritus c/w drug intolerance. She had bilateral mastectomies on 10/2/24 which revealed no residual disease. She started Lupron and Exemestane in 2024, but after a few weeks developed significant joint pain, particularly in knees, hands and back. She changed to anastrozole on 25 and is tolerating it much better with only some residual pain in her knees. She is doing PT for her knees which is helpful. She continues to get hot flashes, both day and night but worse around bedtime. She tried gabapentin but did not tolerate it as it made her too groggy. She was offered Celexa but decided not take it and is just managing by keeping cool and using a fan.  HEALTH MAINTENANCE: PCP: Dr. Nitza Murray in [place] GYN: Dr. Fox Ivy in [place] Mammogram: s/p bilateral mastectomies Pap Smear:  negative Bone density: 25 showed T scores of -0.1 in spine, -1.6 in left femoral neck and -0.7 in left total hip Genetic testin24 [employer] 21 gene panel with no clinically significant variants detected Family History: She has a family history of ovarian cancer in her paternal grandmother; she has a paternal aunt who was diagnosed with colon cancer; she is a paternal uncle who was diagnosed with prostate cancer.  No family risk of undergoing genetic testing.  There is no family history of breast cancer.

## 2025-04-23 NOTE — PHYSICAL EXAM
[Fully active, able to carry on all pre-disease performance without restriction] : Status 0 - Fully active, able to carry on all pre-disease performance without restriction [Normal] : affect appropriate [de-identified] : Bilateral nipple sparing mastectomies with implant reconstructions. Left floating breast implant with left nipple malposition [de-identified] : Fine macular rash on arms

## 2025-04-28 NOTE — HISTORY OF PRESENT ILLNESS
[FreeTextEntry8] : Ms. Quintanilla is a 29-yo Female with PMHx of poorly differentiated DCIS (1/2024) s/p chemotherapy and bilateral mastectomy (10/2024) presenting for acute visit. Reporting headaches.  #Headaches - Reporting constant headaches x1 month. Has unilateral but also bilateral pain, eye throbbing and general head pain. Reported prior blurry vision at night but not reporting significant change or worsening. Tylenol/ibuprofen not particularly helpful. Denies recent trauma. No hx of HTN, /68 today. No known prior hx of migraines. Reports no gross photophobia but does feel light sensitive when driving at night and seeing headlights.

## 2025-04-28 NOTE — END OF VISIT
[] : Resident [FreeTextEntry3] : 30 minutes of total time was spent on the date of the encounter. This included time for review of medical records and laboratory results, face to face time (including the physical examination counseling and coordination of care), time for patient education, treatment plans, answering questions, communicating with other doctors and for medical record documentation.  The time spent is separate from time spent on separately billed procedures.

## 2025-04-28 NOTE — PHYSICAL EXAM
[No Acute Distress] : no acute distress [No Respiratory Distress] : no respiratory distress  [Normal] : no respiratory distress, lungs were clear to auscultation bilaterally and no accessory muscle use [No Focal Deficits] : no focal deficits [Speech Grossly Normal] : speech grossly normal [Normal Affect] : the affect was normal [Alert and Oriented x3] : oriented to person, place, and time [Normal Mood] : the mood was normal

## 2025-04-30 NOTE — PHYSICAL EXAM
[Well Developed] : well developed [Well Nourished] : well nourished [No Acute Distress] : no acute distress [Normal Conjunctiva] : normal conjunctiva [No Carotid Bruit] : no carotid bruit [No Murmur] : no murmur [Normal S1, S2] : normal S1, S2 [No Rub] : no rub [Clear Lung Fields] : clear lung fields [Good Air Entry] : good air entry [No Respiratory Distress] : no respiratory distress  [Soft] : abdomen soft [Non Tender] : non-tender [Normal Gait] : normal gait [No Edema] : no edema [No Cyanosis] : no cyanosis [No Clubbing] : no clubbing [No Varicosities] : no varicosities [No Rash] : no rash [No Skin Lesions] : no skin lesions [Moves all extremities] : moves all extremities [No Focal Deficits] : no focal deficits [Normal Speech] : normal speech [Alert and Oriented] : alert and oriented [Normal memory] : normal memory

## 2025-04-30 NOTE — PHYSICAL EXAM
[Well Developed] : well developed [Well Nourished] : well nourished [No Acute Distress] : no acute distress [Normal Conjunctiva] : normal conjunctiva [No Carotid Bruit] : no carotid bruit [Normal S1, S2] : normal S1, S2 [No Murmur] : no murmur [No Rub] : no rub [Clear Lung Fields] : clear lung fields [Good Air Entry] : good air entry [No Respiratory Distress] : no respiratory distress  [Soft] : abdomen soft [Normal Gait] : normal gait [Non Tender] : non-tender [No Edema] : no edema [No Cyanosis] : no cyanosis [No Clubbing] : no clubbing [No Varicosities] : no varicosities [No Rash] : no rash [No Skin Lesions] : no skin lesions [Moves all extremities] : moves all extremities [No Focal Deficits] : no focal deficits [Normal Speech] : normal speech [Alert and Oriented] : alert and oriented [Normal memory] : normal memory

## 2025-05-13 NOTE — HISTORY OF PRESENT ILLNESS
[FreeTextEntry1] : 29 year-old R handed woman, PMH L breast DCIS (ER negative (0%), [place] 31-40%, HER-2 negative (diagnosed at age 28)) s/p 7 rounds of chemotherapy (adriamycin, cytoxan, neulasta then on taxol) May to August 2024, on exemestane since 10/2024 and recently switched to anastrozole, alpha thalassemia trait, iron deficiency anemia (reports resolved) presenting for headache.   28yo F with infiltrating poorly differentiated ductal carcinoma,   Information obtained from patient, EMR.  Reports 1 month duration of persistent headaches, location migrating from center to left to R side of head, not tender to palpation. Not worsened with changes in position. +photophobia -sound sensitivity Reports nausea q3days, no vomiting.Sometimes associated with b/l eyelid twitching.Not improved with OTCs, was also trialed on sumatriptan which did not help. No changes in speech/motor/sensory symptoms. Reports some small forgetfulnes.no stiffnes in neck or neck pain. Reports two days prior had bleeding in R ear (states it covered two q-tips).  Also reports ringing in R ear.  Advocates two weeks of intermittent b/l blurred vision, previously wore glasses. Has ophtho appointment in a fw months. Reports 1.5-2 weeks duration of room spinning sensation few seconds sometimes a/w nausea worsened with positional changes. 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

## 2025-05-13 NOTE — DISCUSSION/SUMMARY
[FreeTextEntry1] : 29 year old R handed woman, PMH L breast DCIS s/p 7 rounds of chemotherapy (adriamycin, cytoxan, neulasta then on taxol) May to August 2024, on exemestane since 10/2024 and recently switched to anastrozole , alpha thalassemia trait, iron deficiency anemia (reports resolved) presenting for headache.  Impression:  (1) Migrainous headache - +photophobia, nausea (2) Vertigo few seconds in duration  Recommendations: [] nortryptiline 10 QHS   [] rizatriptan 10 PRN for breakthrough headache [] meclizine 12.5 PRN for vertigo [] MRI brain wwo contrast [] f/u 6 weeks

## 2025-05-23 NOTE — PHYSICAL EXAM
[General Appearance - Alert] : alert [General Appearance - In No Acute Distress] : in no acute distress [Extraocular Movements] : extraocular movements were intact [Oropharynx] : the oropharynx was normal [Exaggerated Use Of Accessory Muscles For Inspiration] : no accessory muscle use [Auscultation Breath Sounds / Voice Sounds] : lungs were clear to auscultation bilaterally [Heart Sounds] : normal S1 and S2 [Murmurs] : no murmurs [Edema] : there was no peripheral edema [Abdomen Tenderness] : non-tender [Cervical Lymph Nodes Enlarged Posterior Bilaterally] : posterior cervical [Cervical Lymph Nodes Enlarged Anterior Bilaterally] : anterior cervical [No Spinal Tenderness] : no spinal tenderness [Musculoskeletal - Swelling] : no joint swelling seen [Motor Tone] : muscle strength and tone were normal [] : no rash [No Focal Deficits] : no focal deficits [Oriented To Time, Place, And Person] : oriented to person, place, and time

## 2025-05-28 NOTE — ASSESSMENT
[FreeTextEntry1] : 30F PMH L breast DCIS s/p chemo and b/l mastectomy with reconstruction, presenting for joint pain  #b/l knee pain with lateral hip/side pain -Joint pain likely side effect from exemestane, given that joint pains started 1 month of starting medication. No synovitis on exam, no inflammatory arthritis. Joint pain improving after PT. Also switched from exemestane to anastrazole. Hip pain resolved, knee pain better. May also have component of patellofemoral syndrome, which PT will help as well -XR knees and hips normal -Discussed with pt about PT, pt would like to c/w PT once per week and do home exercises at home, PT renewal sent  #vitamin D deficiency -Low vit D 19.7, c/w supplements  -DEXA normal  Case discussed with Dr Berrios RTC PRELICEO Santos MD Rheumatology Fellow

## 2025-05-28 NOTE — HISTORY OF PRESENT ILLNESS
[FreeTextEntry1] : 30F PMH L breast DCIS s/p chemo and b/l masectomy now on exemestane referred to rheumatology for joint pains  #b/l knee and lateral hip pain -Pt was on chemotherapy (adriamycin, cytoxan, neulasta then on taxol) May to August 2024, and now on exemestane since 10/2024 -Pt reports that knee pain started first in 11/2024, then with lateral hip pain. Pain is constant, no specific triggers, can be worsened with walking or standing for a long time or even at rest. Feels like a sharp sensation, the lateral hip side pain does not radiate down to the knees. No swelling of the joints -Has neuropathy since chemo, as well as alopecia, which is improving, and had oral ulcers during chemo -Has tried tylenol for pain with minimal relief, is on gabapentin PRN for hot flashes -Denies fevers, weight loss, new rash, sicca symptoms, CP, SOB, urinary changes  FH: mother with arthritis SH: denies smoking, occasional alcohol No recent travels Has 1 son  2/28/25: Has been doing PT for past month for 2 times per week. Has switched exemestane to anastrazole, and has been taking anastrazole for 2 weeks. Feels joint pains are about 50% improved, but some days the pain is the same as before. Hip pain has resolved, but has b/l frontal knee pain, clyde with activity and stairs or standing up after sitting awhile. Has ankle pain in the am when she gets out of bed and improves after an hour. Has some residual numbness in hands. Hot flashes are worse on anastrazole, is planning on starting celexa.  5/23/2025:  Pt is doing well, has been continuing PT 2 times per week. Has R knee pain intermittently, especially going up and down stairs. No other areas of joint pain. Has been having new migraines, seeing neuro and did MRI, pending results. Has not started triptans or TCA yet [Anorexia] : no anorexia [Weight Loss] : no weight loss [Malaise] : no malaise [Fever] : no fever [Chills] : no chills [Fatigue] : no fatigue [Depression] : no depression [Malar Facial Rash] : no malar facial rash [Skin Lesions] : no lesions [Skin Nodules] : no skin nodules [Oral Ulcers] : no oral ulcers [Cough] : no cough [Dry Mouth] : no dry mouth [Dysphonia] : no dysphonia [Dysphagia] : no dysphagia [Shortness of Breath] : no shortness of breath [Chest Pain] : no chest pain [Joint Swelling] : no joint swelling [Joint Warmth] : no joint warmth [Joint Deformity] : no joint deformity [Decreased ROM] : no decreased range of motion [Morning Stiffness] : no morning stiffness [Falls] : no falls [Difficulty Standing] : no difficulty standing [Difficulty Walking] : no difficulty walking [Dyspnea] : no dyspnea [Myalgias] : no myalgias [Muscle Weakness] : no muscle weakness [Muscle Spasms] : no muscle spasms [Muscle Cramping] : no muscle cramping [Visual Changes] : no visual changes [Eye Pain] : no eye pain [Eye Redness] : no eye redness [Dry Eyes] : no dry eyes

## 2025-05-28 NOTE — END OF VISIT
[] : Fellow [FreeTextEntry3] : Patient seen with Dr Santos. Improved since doing PT and switching to anastrazole. We can follow prn [Time Spent: ___ minutes] : I have spent [unfilled] minutes of time on the encounter which excludes teaching and separately reported services.

## 2025-06-12 NOTE — ASSESSMENT
[FreeTextEntry1] : MALKA RANDHAWA is a 28 y/o F referred by Dr. 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 Now s/p BL TE to implant exchange, R breast mastopexy and L breast capsulorraphy on Sven 3, 2025. Patient interested in liposuction and fat grafting to medial breasts. Risks including infection, bleeding, fat necrosis, loss of grafted fat volume, asymmetry, dissatisfaction with achievable fullness. No palpable hernias on exam.   - Book for liposuction and fat grafting for bilateral reconstructed breasts with Dr. Broderick Loco, PGY2

## 2025-06-12 NOTE — ASSESSMENT
[FreeTextEntry1] : MALKA RANDHAWA is a 30 y/o F referred by Dr. 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 Now s/p BL TE to implant exchange, R breast mastopexy and L breast capsulorraphy on Sven 3, 2025. Patient interested in liposuction and fat grafting to medial breasts. Risks including infection, bleeding, fat necrosis, loss of grafted fat volume, asymmetry, dissatisfaction with achievable fullness. No palpable hernias on exam.   - Book for liposuction and fat grafting for bilateral reconstructed breasts with Dr. Broderick Loco, PGY2

## 2025-06-12 NOTE — PHYSICAL EXAM
[de-identified] : Breasts: BL breasts soft, incisions c/d/i, healing well. Bilateral breasts with slight visible rippling, most prominent in left superomedial breast. No palpable collections or masses. Slight hollowing of breasts superomedially.  [de-identified] : No palpable hernias on exam. Small umbilical port site incision from prior laparoscopic surgery.

## 2025-06-12 NOTE — PHYSICAL EXAM
Continue previously prescribed psychotropic medications at present dosing including upward titration of Trazodone 50 mg to 100 mg nightly to address insomnia in addition to augmenting agent for Effexor XR to address depression. Continue Effexor  mg daily to address dysphoric mood including depression and anxiety in addition to post-traumatic stress disorder and chronic pain as off-label agent.    72 hour notice rescinded on 11/24/24  Orders from past 72 hours:    gabapentin (NEURONTIN) 300 mg capsule; Take 1 capsule (300 mg total) by mouth 3 (three) times a day    lamoTRIgine (LaMICtal) 25 mg tablet; Take 1 tablet (25 mg total) by mouth daily    OLANZapine (ZyPREXA) 15 mg tablet; Take 1 tablet (15 mg total) by mouth daily at bedtime    traZODone (DESYREL) 100 mg tablet; Take 1 tablet (100 mg total) by mouth daily at bedtime    venlafaxine (EFFEXOR-XR) 150 mg 24 hr capsule; Take 1 capsule (150 mg total) by mouth daily     [de-identified] : Breasts: BL breasts soft, incisions c/d/i, healing well. Bilateral breasts with slight visible rippling, most prominent in left superomedial breast. No palpable collections or masses. Slight hollowing of breasts superomedially.  [de-identified] : No palpable hernias on exam. Small umbilical port site incision from prior laparoscopic surgery.

## 2025-06-12 NOTE — HISTORY OF PRESENT ILLNESS
[FreeTextEntry1] : MALKA RANDHAWA is a 28 y/o F referred by Dr. 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 Reyes's office for breast reconstruction tentatively scheduled for BL mastectomy and L SNLB. Was diagnosed at age 28 with L breast cancer, with bx confirming diagnosis. Grandmother on dad's side had ovarian cancer but denies any other known familial history. She received 7 rounds of chemotherapy most recently august 7th, says this was her last round. She is seeing a cardiologist for side effects of taxol, currently wears holter monitor, pending repeat visit/TTE results. Never smoker, one child, no other medications. Reports she is a 32C. She had previously seen a plastic surgeon at [facility] whom she does not recall the name of, who discussed with her reconstructive options. she would like implants at this time based on that conversation.  Interval hx (10/7/2024): Patient now POD5 b/l mx, L SLNB, b/l TE prepec with alloderm filled with 200cc saline. Doing well no complaints. Wearing surgical bra. Recording drain outputs. Pain controlled without narcotics. Completing abx.  Interval hx (10/14/2024): Patient now POD12 b/l mx, L SLNB, b/l TE prepec with alloderm filled with 200cc saline. Doing well without complaints and wearing surgical bra. Sponge bathing to keep dressings dry. Drain outputs have been about 70 cc/24 hours for L DENNIS and 40 cc/24 hours for R DENNIS. she denies pain, fevers, chills, malaise.  Interval hx (10/21/2024): Patient now POD19 s/p BL mx, L SLNB, BL TE prepec with alloderm. Currently with 200cc saline from OR. No issues except some muscle discomfort from the left superior TE tab attachment. Has not been showering. Drain outputs low b/l, removed in clinic.  Interval hx (10/28/2024): Patient now POD26 s/p BL mx, L SLNB, BL TE prepec with alloderm. Currently with 300cc saline. no issues. showering. no erythema. no discharge. no swelling. L breast larger than Right. Filled in clinic 10/28 w/ 100 cc bilaterally for a total of 400  Interval hx (11/04/24): Patient now POD33 s/p BL mx, L SLNB, BL TE prepec with alloderm. Currently with 400cc saline. Patient doing well. No fevers, chills. Notes L arm pain and is starting some massage therapy. Notes intermittent medial chest pain, but seems to come and go without any provocation. Filled in clinic with 100 cc bilaterally for a total of 500cc.  Interval hx (11/11/24): Patient s/p BL mx w/ SLNB L, B/L TE prepec with alloderm. Currently happy with her size at 500cc filled bilaterally. Interested in booking for TE exchange for implants in early December or potentially sooner.  Interval hx (1/13/25): Patient is now s/p TE to implant exchange, R breast mastopexy and L breast capsulorraphy. Doing well. Recovering appropriately. Only reports some discomfort at L breast superior pole where she feels a small "ball". Interval hx (1/27/25): Pt feels well, denies fevers/chills/pain in breasts. Expresses concern regarding rippling particularly prominent in L medial breast. Also states she feels her implants are too small.  Interval hx 3/3/25: Doing well, two weeks ago noticed some leaking from R nipple - describes as milky appearing fluid. Discussed that this can be a normal non-pathologic finding, especially in women with history of prior pregnancies. No other concerns. Well healed.  Interval hx 6/2/25: Doing well, leakage stopped. Denies fever, chills, CP, SOB. States she wishes her breasts were more full medially. 
[FreeTextEntry1] : MALKA RANDHAWA is a 30 y/o F referred by Dr. 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 Reyes's office for breast reconstruction tentatively scheduled for BL mastectomy and L SNLB. Was diagnosed at age 28 with L breast cancer, with bx confirming diagnosis. Grandmother on dad's side had ovarian cancer but denies any other known familial history. She received 7 rounds of chemotherapy most recently august 7th, says this was her last round. She is seeing a cardiologist for side effects of taxol, currently wears holter monitor, pending repeat visit/TTE results. Never smoker, one child, no other medications. Reports she is a 32C. She had previously seen a plastic surgeon at [facility] whom she does not recall the name of, who discussed with her reconstructive options. she would like implants at this time based on that conversation.  Interval hx (10/7/2024): Patient now POD5 b/l mx, L SLNB, b/l TE prepec with alloderm filled with 200cc saline. Doing well no complaints. Wearing surgical bra. Recording drain outputs. Pain controlled without narcotics. Completing abx.  Interval hx (10/14/2024): Patient now POD12 b/l mx, L SLNB, b/l TE prepec with alloderm filled with 200cc saline. Doing well without complaints and wearing surgical bra. Sponge bathing to keep dressings dry. Drain outputs have been about 70 cc/24 hours for L DENNIS and 40 cc/24 hours for R DENNIS. she denies pain, fevers, chills, malaise.  Interval hx (10/21/2024): Patient now POD19 s/p BL mx, L SLNB, BL TE prepec with alloderm. Currently with 200cc saline from OR. No issues except some muscle discomfort from the left superior TE tab attachment. Has not been showering. Drain outputs low b/l, removed in clinic.  Interval hx (10/28/2024): Patient now POD26 s/p BL mx, L SLNB, BL TE prepec with alloderm. Currently with 300cc saline. no issues. showering. no erythema. no discharge. no swelling. L breast larger than Right. Filled in clinic 10/28 w/ 100 cc bilaterally for a total of 400  Interval hx (11/04/24): Patient now POD33 s/p BL mx, L SLNB, BL TE prepec with alloderm. Currently with 400cc saline. Patient doing well. No fevers, chills. Notes L arm pain and is starting some massage therapy. Notes intermittent medial chest pain, but seems to come and go without any provocation. Filled in clinic with 100 cc bilaterally for a total of 500cc.  Interval hx (11/11/24): Patient s/p BL mx w/ SLNB L, B/L TE prepec with alloderm. Currently happy with her size at 500cc filled bilaterally. Interested in booking for TE exchange for implants in early December or potentially sooner.  Interval hx (1/13/25): Patient is now s/p TE to implant exchange, R breast mastopexy and L breast capsulorraphy. Doing well. Recovering appropriately. Only reports some discomfort at L breast superior pole where she feels a small "ball". Interval hx (1/27/25): Pt feels well, denies fevers/chills/pain in breasts. Expresses concern regarding rippling particularly prominent in L medial breast. Also states she feels her implants are too small.  Interval hx 3/3/25: Doing well, two weeks ago noticed some leaking from R nipple - describes as milky appearing fluid. Discussed that this can be a normal non-pathologic finding, especially in women with history of prior pregnancies. No other concerns. Well healed.  Interval hx 6/2/25: Doing well, leakage stopped. Denies fever, chills, CP, SOB. States she wishes her breasts were more full medially. 
[employer]

## 2025-07-12 NOTE — ASSESSMENT
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 Here for follow up.   Age at Presentation: 28 Procedure:  BL NSM, L SLNB w/ TE's on 10/2/24 Pathology: no residual left breast tumor s/p NAC; 0/10 LN Genetics: Negative (4/6/24, Cyndee)  Stage:  ypT0 N0  We discussed her clinical breast exam today is unremarkable with no clinical evidence of disease.    - Medical oncology (Following w/ Dr. Blakely). Completed NAC on 8/7/24. Next appointment on 7/16/25 to discuss migraines since starting anastrozole.  - Following w/ Plastics Clinic: Planning to undergo liposuction and fat grafting to bilateral reconstructed breasts. - Seen by Genetic counseling.  - RTO 6 months.

## 2025-07-12 NOTE — HISTORY OF PRESENT ILLNESS
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 Here for follow up.   Left breast cancer diagnosed at age 28. 2024 Patient reports feeling a lump in her left breast. She presented to her GYN's office who started workup.  3/19/24 Left 11N8 core bx revealed poorly differentiated IDC, ER 0%, IL 31-40%, HER-2 negative, Ki-67 90%. High grade DCIS.  24 Left axillary core bx of prominent LN revealed a reactive lymph node. 2024 She was seen for fertility preservation and underwent ovarian stimulation on  with egg retrieval on 24.  24 Neoadjuvant chemotherapy started with dd- Adriamycin & Cytoxan with Neulasta x 4 cycles to be followed by Taxol x 4 cycles q 2 weeks. After two cycles of AC at [facility] ([facility]) she requested to transfer care to [facility] which is closer to home.  24 S/p Taxol C1  24 s/p Taxol C2.  24 Accompanied by her mother. c/o diffuse rash over fingertips and face after latest dose of Taxol. Projected end date of chemotherapy is 24.  24 Met with Cardiology for episodes of near syncope during NAC associated with low blood pressure - plan to repeat echo. Holter monitor placed to monitor for PACs and eval of possible arrhythmia.  24- The last Taxol was withheld due to the exacerbation of pruritus following each treatment. The patient was in the ED two days ago because of severe itching.  9/3/24 Patient presents for surgical planning. Since last visit has completed NAC on 24 - Breast MRI done showing complete treatment response. She also met with plastics clinic on 24 to discuss reconstruction options. Current plan is for staged TE-implant reconstruction.  10/2/24 SURGERY:   BL NSM, L SLNB w/ TE's - CTR. no residual left breast tumor identified. 0/10 LN negative for cancer.  10/17/24 POD#15 pain well controlled.  good ROM.  bilateral drains intact.  no fevers or chills.  1/3/25 s/p TE to implant exchange, R breast mastopexy and L breast capsulorraphy (plastics clinic) 24 Here for follow up.   Started exemestane 4 weeks ago.  has developed knee pain and is seeing a rheumatologist and will be starting PT for the knee.   25 Here for follow up. Switched from exemestane to anastrozole with resolution of hip pain. Reports new headaches/migraines/hot flashes since starting anastrozole, she is following with neurologist underwent an MRI Head on 25 - no metastatic disease. Planning to undergo revision surgery with Dr. Villafana.  No breast or systemic symptoms.   No PMHx.  PSHx: Lap CCY, TMJ surgery - Reports delayed awaking after surgery.  Meds:   exemestane, leupron NKDA Family Hx: Ovarian cancer (Paternal grandmother). Colon Cancer (Paternal aunt). Prostate cancer (paternal uncle).  GYN:  Occupation: Housewife, Mom  Social: Smoking:  Never       ETOH: Rarely consumes alcohol.

## 2025-07-12 NOTE — ASSESSMENT
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 Here for follow up.   Age at Presentation: 28 Procedure:  BL NSM, L SLNB w/ TE's on 10/2/24 Pathology: no residual left breast tumor s/p NAC; 0/10 LN Genetics: Negative (4/6/24, Cyndee)  Stage:  ypT0 N0  We discussed her clinical breast exam today is unremarkable with no clinical evidence of disease.    - Medical oncology (Following w/ Dr. Blakely). Completed NAC on 8/7/24. Next appointment on 7/16/25 to discuss migraines since starting anastrozole.  - Following w/ Plastics Clinic: Planning to undergo liposuction and fat grafting to bilateral reconstructed breasts. - Seen by Genetic counseling.  - RTO 6 months.

## 2025-07-12 NOTE — PHYSICAL EXAM
[Normocephalic] : normocephalic [Atraumatic] : atraumatic [Supple] : supple [No Supraclavicular Adenopathy] : no supraclavicular adenopathy [No Cervical Adenopathy] : no cervical adenopathy [Examined in the supine and seated position] : examined in the supine and seated position [de-identified] :  Normal respiratory effort  [de-identified] : Bilateral nipples and post-mastectomy flaps are viable. No palpable mass or skin changes. No clinical lymphadenopathy.

## 2025-07-12 NOTE — HISTORY OF PRESENT ILLNESS
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 Here for follow up.   Left breast cancer diagnosed at age 28. 2024 Patient reports feeling a lump in her left breast. She presented to her GYN's office who started workup.  3/19/24 Left 11N8 core bx revealed poorly differentiated IDC, ER 0%, MD 31-40%, HER-2 negative, Ki-67 90%. High grade DCIS.  24 Left axillary core bx of prominent LN revealed a reactive lymph node. 2024 She was seen for fertility preservation and underwent ovarian stimulation on  with egg retrieval on 24.  24 Neoadjuvant chemotherapy started with dd- Adriamycin & Cytoxan with Neulasta x 4 cycles to be followed by Taxol x 4 cycles q 2 weeks. After two cycles of AC at [facility] ([facility]) she requested to transfer care to [facility] which is closer to home.  24 S/p Taxol C1  24 s/p Taxol C2.  24 Accompanied by her mother. c/o diffuse rash over fingertips and face after latest dose of Taxol. Projected end date of chemotherapy is 24.  24 Met with Cardiology for episodes of near syncope during NAC associated with low blood pressure - plan to repeat echo. Holter monitor placed to monitor for PACs and eval of possible arrhythmia.  24- The last Taxol was withheld due to the exacerbation of pruritus following each treatment. The patient was in the ED two days ago because of severe itching.  9/3/24 Patient presents for surgical planning. Since last visit has completed NAC on 24 - Breast MRI done showing complete treatment response. She also met with plastics clinic on 24 to discuss reconstruction options. Current plan is for staged TE-implant reconstruction.  10/2/24 SURGERY:   BL NSM, L SLNB w/ TE's - CTR. no residual left breast tumor identified. 0/10 LN negative for cancer.  10/17/24 POD#15 pain well controlled.  good ROM.  bilateral drains intact.  no fevers or chills.  1/3/25 s/p TE to implant exchange, R breast mastopexy and L breast capsulorraphy (plastics clinic) 24 Here for follow up.   Started exemestane 4 weeks ago.  has developed knee pain and is seeing a rheumatologist and will be starting PT for the knee.   25 Here for follow up. Switched from exemestane to anastrozole with resolution of hip pain. Reports new headaches/migraines/hot flashes since starting anastrozole, she is following with neurologist underwent an MRI Head on 25 - no metastatic disease. Planning to undergo revision surgery with Dr. Villafana.  No breast or systemic symptoms.   No PMHx.  PSHx: Lap CCY, TMJ surgery - Reports delayed awaking after surgery.  Meds:   exemestane, leupron NKDA Family Hx: Ovarian cancer (Paternal grandmother). Colon Cancer (Paternal aunt). Prostate cancer (paternal uncle).  GYN:  Occupation: Housewife, Mom  Social: Smoking:  Never       ETOH: Rarely consumes alcohol.

## 2025-07-12 NOTE — RESULTS/DATA
[FreeTextEntry1] : BREAST PATH/RAD REVIEW.  [facility].  3/1/24 BL Dx US: Birads 4.  - Indeterminate L 11:00 1.8 cm hypoechoic mass at area of (palp) concern (Rec US guided bx).  - L 12:30N1 0.5 cm hypoechoic lesion, probably benign (Rec 6-month f/u L Dx US if above ^ core bx is benign).  - No sonographic findings over R 4:00-6:00 additional region of (palp) concern. (Rec clinical f/u).  - Multiple b/l breast cysts (R up to 1.9 cm and L up to 0.8 cm)   3/19/24 Left [11N8] US guided core bx ([facility]): IDC (G3/poorly diff). DCIS (G3/ High grade) extends into the lobules. ER 0%, ID 31-40%, Ki-67 90%, HER2 (1+) Negative. Hemant Clip. Concordant.  (Rec Surg C/S, BL Dx MG is due at this time, given malignant core bx results a US guided bx of Left 12:30N1 lesion is now recommended).   4/3/24 Slide Review:  Left 11N8 core bx: Infiltrating poorly differentiated ductal carcinoma. High-grade DCIS. Receptors per outside report.   4/3/24 Breast MRI: BIRADS 6.  - R breast w/o suspicious enhancement.  - L UIQ 2.4 cm known large IDC.  - L 12:00 w/o suspicious findings corresponding to area for which biopsy was previously recommended; there are cysts in this region (Rec repeat US of 12-1:00 axis; possible bx)  - L 0.8 cm prominent low-lying L axillary LN which may correspond to (palp) finding (Rec Dx US w/ possible US guided bx).   4/5/24 B/L Dx MG/ L US: Birads 6. Extremely dense.  - Palpable Left superior slightly medial IDC  - L scattered breast cysts including one in the L UOQ measuring 0.8 cm. No suspicious findings at L 12-1:00.  - L 11N8 mass at site of known IDC to be 2.4 cm.  - L prominent LN w/ cortex measuring 0.5 cm (Rec US guided bx)   4/5/24 Left [axillary] US guided core bx: Reactive LN. Twirl Clip. Benign and concordant. (Rec Surg C/S).   8/28/24 Breast MRI s/p NAC: birads 6.  - R 9:00 0.6 cm inflamed cyst.  - L posterior UIQ bx clip corresponding to site of bx-proven cancer w/ interval resolution of previously noted 2.4 cm enhancing mass.  - L bx clip within small axillary LN, minimally decreased in size compared w/ prior study c/w bx-proven benign LN.   10/2/24 s/p BL NSM, L SLNB w/ TE's. ypT0N0  - Left mastectomy: No residual tumor identified s/p NAC. Tumor bed measures 2.2 x 1.5 x 0.8 cm. 0/10 Lymph nodes negative for carcinoma.   - Right mastectomy: FCC.

## 2025-07-12 NOTE — PHYSICAL EXAM
[Normocephalic] : normocephalic [Atraumatic] : atraumatic [Supple] : supple [No Supraclavicular Adenopathy] : no supraclavicular adenopathy [No Cervical Adenopathy] : no cervical adenopathy [Examined in the supine and seated position] : examined in the supine and seated position [de-identified] :  Normal respiratory effort  [de-identified] : Bilateral nipples and post-mastectomy flaps are viable. No palpable mass or skin changes. No clinical lymphadenopathy.

## 2025-07-12 NOTE — RESULTS/DATA
[FreeTextEntry1] : BREAST PATH/RAD REVIEW.  [facility].  3/1/24 BL Dx US: Birads 4.  - Indeterminate L 11:00 1.8 cm hypoechoic mass at area of (palp) concern (Rec US guided bx).  - L 12:30N1 0.5 cm hypoechoic lesion, probably benign (Rec 6-month f/u L Dx US if above ^ core bx is benign).  - No sonographic findings over R 4:00-6:00 additional region of (palp) concern. (Rec clinical f/u).  - Multiple b/l breast cysts (R up to 1.9 cm and L up to 0.8 cm)   3/19/24 Left [11N8] US guided core bx ([facility]): IDC (G3/poorly diff). DCIS (G3/ High grade) extends into the lobules. ER 0%, OK 31-40%, Ki-67 90%, HER2 (1+) Negative. Hemant Clip. Concordant.  (Rec Surg C/S, BL Dx MG is due at this time, given malignant core bx results a US guided bx of Left 12:30N1 lesion is now recommended).   4/3/24 Slide Review:  Left 11N8 core bx: Infiltrating poorly differentiated ductal carcinoma. High-grade DCIS. Receptors per outside report.   4/3/24 Breast MRI: BIRADS 6.  - R breast w/o suspicious enhancement.  - L UIQ 2.4 cm known large IDC.  - L 12:00 w/o suspicious findings corresponding to area for which biopsy was previously recommended; there are cysts in this region (Rec repeat US of 12-1:00 axis; possible bx)  - L 0.8 cm prominent low-lying L axillary LN which may correspond to (palp) finding (Rec Dx US w/ possible US guided bx).   4/5/24 B/L Dx MG/ L US: Birads 6. Extremely dense.  - Palpable Left superior slightly medial IDC  - L scattered breast cysts including one in the L UOQ measuring 0.8 cm. No suspicious findings at L 12-1:00.  - L 11N8 mass at site of known IDC to be 2.4 cm.  - L prominent LN w/ cortex measuring 0.5 cm (Rec US guided bx)   4/5/24 Left [axillary] US guided core bx: Reactive LN. Twirl Clip. Benign and concordant. (Rec Surg C/S).   8/28/24 Breast MRI s/p NAC: birads 6.  - R 9:00 0.6 cm inflamed cyst.  - L posterior UIQ bx clip corresponding to site of bx-proven cancer w/ interval resolution of previously noted 2.4 cm enhancing mass.  - L bx clip within small axillary LN, minimally decreased in size compared w/ prior study c/w bx-proven benign LN.   10/2/24 s/p BL NSM, L SLNB w/ TE's. ypT0N0  - Left mastectomy: No residual tumor identified s/p NAC. Tumor bed measures 2.2 x 1.5 x 0.8 cm. 0/10 Lymph nodes negative for carcinoma.   - Right mastectomy: FCC.

## 2025-07-16 NOTE — PHYSICAL EXAM
[Fully active, able to carry on all pre-disease performance without restriction] : Status 0 - Fully active, able to carry on all pre-disease performance without restriction [Normal] : affect appropriate [de-identified] : Bilateral nipple sparing mastectomies with implant reconstructions.  [de-identified] : Fine macular rash on arms

## 2025-07-16 NOTE — HISTORY OF PRESENT ILLNESS
[Disease: _____________________] : Disease: [unfilled] [T: ___] : T[unfilled] [N: ___] : N[unfilled] [M: ___] : M[unfilled] [AJCC Stage: ____] : AJCC Stage: [unfilled] [de-identified] : Left breast cancer diagnosed at age 28. The patient felt a lump in her left breast in 1/2024. She presented to her GYN's office. 3/1/24 Bilateral ultrasound identified a 1.8 cm mass in the left breast at 11:00. No abnormal axillary lymph nodes identified. 3/19/24 Left breast core biopsy revealed infiltrating poorly differentiated ductal carcinoma, ER negative (0%), AR 31-40%, HER-2 negative, Ki-67 90%. Also noted was DCIS, high-grade, solid type. 4/3/24 Breast MRI revealed no suspicious enhancement in the right breast and negative for right axillary adenopathy. Evaluation of the left breast demonstrates known large invasive ductal carcinoma in the upper/inner quadrant measuring 2.4 cm. There is no suspicious finding at the 12:00 axis of the left breast to correspond to the finding on sonogram for which biopsy was previously recommended; there are cysts in this region and repeat ultrasound is recommended. There is a prominent low-lying axillary lymph node measuring 8 mm which may correspond to the palpable finding and targeted ultrasound to potentially guide biopsy is recommended. 4/5/24 Bilateral diagnostic mammogram and sonogram revealed extremely dense breasts which lowers the sensitivity of mammography. Evaluation of the left breast demonstrates a mass far posteriorly superiorly containing clip from prior biopsy, corresponding to known carcinoma measuring 2.3 x 2.4 cm. There is a prominent lymph node in left axilla with cortex measuring 5 mm for which ultrasound-guided biopsy is recommended. No suspicious calcifications. Evaluation of the right breast demonstrates no suspicious finding. 4/5/24 Core biopsy of left axillary lymph node revealed reactive lymph node. 4/2024 She was seen for fertility preservation and underwent ovarian stimulation on 4/19 with egg retrieval on 5/1/24 without complication. 5/2/24 Echocardiogram revealed normal LV and RV size and function with LVEF = 64% and LV Global longitudinal strain -22% (normal < -19%). 5/7/24 Mediport placement with tip of catheter in right atrium and approved for use. 5/8/24 - 8/21/24 Neoadjuvant chemotherapy with dose dense Adriamycin (60 mg/m2) & Cytoxan (600 mg/m2) with Neulasta x 4 cycles followed by Taxol (175 mg/m2) x 4 cycles, given every 2 weeks. After two cycles of AC at [facility]) she requested to transfer care to [facility] which is closer to home. 10/2/24 Bilateral nipple sparing mastectomies with Dr. Reyes and tissue expander reconstruction by Dr. Byrd.      Left breast showed no residual tumor.  Tumor bed measured 2.2 x 1.5 x 0.8 cm. 0/10 SLN.      Right breast showed fibrocystic changes 11/5/24 Lupron 22.5 mg every 3 months  11/24 - 1/30/25 Exemestane 25 mg daily, discontinued due to arthralgias. 2/1/25 Anastrozole started    [de-identified] : Clinically ~2 cm IDC, LN negative on biopsy, ER 0%, NJ 31-40%, HER-2 negative, Ki-67 90% [de-identified] : Teri completed her chemotherapy in 2024, with the final cycle of Taxol held due to pruritus consistent with drug intolerance.  She underwent bilateral mastectomies on 10/2/2024, which showed no residual disease.  In 2024, she started Lupron and Exemestane, but developed significant arthralgia, particularly affecting her knees, hands, and back. Consequently, her medication was switched to anastrozole on 2025, which she is tolerating better, experiencing only mild residual hand and knee pain/stiffness.  She is currently undergoing physical therapy for her knees, which has been beneficial. Teri continues to experience hot flashes, predominantly around bedtime. She previously tried gabapentin but discontinued it due to excessive sedation. Although Celexa was offered, she opted not to take it at the time, but with recent heat wave, she would like to try and see how she does. She also finds herself not sleeping to well, due to stress and anxiety She is no longer experiencing H/A or dizziness which was prominent a few months ago, but seem to have resolved on its own  She is scheduled to have her final revision surgery at the end of this month.  HEALTH MAINTENANCE: PCP: Dr. Nitza Murray in [place] GYN: Dr. Fox Ivy in [place] Mammogram: s/p bilateral mastectomies Pap Smear:  negative Bone density: 25 showed T scores of -0.1 in spine, -1.6 in left femoral neck and -0.7 in left total hip Genetic testin24 [employer] 21 gene panel with no clinically significant variants detected Family History: She has a family history of ovarian cancer in her paternal grandmother; she has a paternal aunt who was diagnosed with colon cancer; she is a paternal uncle who was diagnosed with prostate cancer.  No family risk of undergoing genetic testing.  There is no family history of breast cancer. MRI of the head : 25 No abnoral intracranial enhacement suggest the presence of metastatic

## 2025-07-16 NOTE — HISTORY OF PRESENT ILLNESS
[Disease: _____________________] : Disease: [unfilled] [T: ___] : T[unfilled] [N: ___] : N[unfilled] [M: ___] : M[unfilled] [AJCC Stage: ____] : AJCC Stage: [unfilled] [de-identified] : Left breast cancer diagnosed at age 28. The patient felt a lump in her left breast in 1/2024. She presented to her GYN's office. 3/1/24 Bilateral ultrasound identified a 1.8 cm mass in the left breast at 11:00. No abnormal axillary lymph nodes identified. 3/19/24 Left breast core biopsy revealed infiltrating poorly differentiated ductal carcinoma, ER negative (0%), MI 31-40%, HER-2 negative, Ki-67 90%. Also noted was DCIS, high-grade, solid type. 4/3/24 Breast MRI revealed no suspicious enhancement in the right breast and negative for right axillary adenopathy. Evaluation of the left breast demonstrates known large invasive ductal carcinoma in the upper/inner quadrant measuring 2.4 cm. There is no suspicious finding at the 12:00 axis of the left breast to correspond to the finding on sonogram for which biopsy was previously recommended; there are cysts in this region and repeat ultrasound is recommended. There is a prominent low-lying axillary lymph node measuring 8 mm which may correspond to the palpable finding and targeted ultrasound to potentially guide biopsy is recommended. 4/5/24 Bilateral diagnostic mammogram and sonogram revealed extremely dense breasts which lowers the sensitivity of mammography. Evaluation of the left breast demonstrates a mass far posteriorly superiorly containing clip from prior biopsy, corresponding to known carcinoma measuring 2.3 x 2.4 cm. There is a prominent lymph node in left axilla with cortex measuring 5 mm for which ultrasound-guided biopsy is recommended. No suspicious calcifications. Evaluation of the right breast demonstrates no suspicious finding. 4/5/24 Core biopsy of left axillary lymph node revealed reactive lymph node. 4/2024 She was seen for fertility preservation and underwent ovarian stimulation on 4/19 with egg retrieval on 5/1/24 without complication. 5/2/24 Echocardiogram revealed normal LV and RV size and function with LVEF = 64% and LV Global longitudinal strain -22% (normal < -19%). 5/7/24 Mediport placement with tip of catheter in right atrium and approved for use. 5/8/24 - 8/21/24 Neoadjuvant chemotherapy with dose dense Adriamycin (60 mg/m2) & Cytoxan (600 mg/m2) with Neulasta x 4 cycles followed by Taxol (175 mg/m2) x 4 cycles, given every 2 weeks. After two cycles of AC at [facility]) she requested to transfer care to [facility] which is closer to home. 10/2/24 Bilateral nipple sparing mastectomies with Dr. Reyes and tissue expander reconstruction by Dr. Byrd.      Left breast showed no residual tumor.  Tumor bed measured 2.2 x 1.5 x 0.8 cm. 0/10 SLN.      Right breast showed fibrocystic changes 11/5/24 Lupron 22.5 mg every 3 months  11/24 - 1/30/25 Exemestane 25 mg daily, discontinued due to arthralgias. 2/1/25 Anastrozole started    [de-identified] : Clinically ~2 cm IDC, LN negative on biopsy, ER 0%, RI 31-40%, HER-2 negative, Ki-67 90% [de-identified] : Teri completed her chemotherapy in 2024, with the final cycle of Taxol held due to pruritus consistent with drug intolerance.  She underwent bilateral mastectomies on 10/2/2024, which showed no residual disease.  In 2024, she started Lupron and Exemestane, but developed significant arthralgia, particularly affecting her knees, hands, and back. Consequently, her medication was switched to anastrozole on 2025, which she is tolerating better, experiencing only mild residual hand and knee pain/stiffness.  She is currently undergoing physical therapy for her knees, which has been beneficial. Teri continues to experience hot flashes, predominantly around bedtime. She previously tried gabapentin but discontinued it due to excessive sedation. Although Celexa was offered, she opted not to take it at the time, but with recent heat wave, she would like to try and see how she does. She also finds herself not sleeping to well, due to stress and anxiety She is no longer experiencing H/A or dizziness which was prominent a few months ago, but seem to have resolved on its own  She is scheduled to have her final revision surgery at the end of this month.  HEALTH MAINTENANCE: PCP: Dr. Nitza Murray in [place] GYN: Dr. Fox Ivy in [place] Mammogram: s/p bilateral mastectomies Pap Smear:  negative Bone density: 25 showed T scores of -0.1 in spine, -1.6 in left femoral neck and -0.7 in left total hip Genetic testin24 [employer] 21 gene panel with no clinically significant variants detected Family History: She has a family history of ovarian cancer in her paternal grandmother; she has a paternal aunt who was diagnosed with colon cancer; she is a paternal uncle who was diagnosed with prostate cancer.  No family risk of undergoing genetic testing.  There is no family history of breast cancer. MRI of the head : 25 No abnoral intracranial enhacement suggest the presence of metastatic

## 2025-07-16 NOTE — PHYSICAL EXAM
[Fully active, able to carry on all pre-disease performance without restriction] : Status 0 - Fully active, able to carry on all pre-disease performance without restriction [Normal] : affect appropriate [de-identified] : Bilateral nipple sparing mastectomies with implant reconstructions.  [de-identified] : Fine macular rash on arms

## 2025-07-25 NOTE — HISTORY OF PRESENT ILLNESS
[FreeTextEntry1] : 29 year-old R handed woman, PMH L breast DCIS (ER negative (0%), [place] 31-40%, HER-2 negative (diagnosed at age 28)) s/p 7 rounds of chemotherapy (adriamycin, cytoxan, neulasta then on taxol) May to August 2024, on exemestane since 10/2024 and recently switched to anastrozole, alpha thalassemia trait, iron deficiency anemia (reports resolved) presenting for headache.    Information obtained from patient, EMR.  Reports 1 month duration of persistent headaches, location migrating from center to left to R side of head, not tender to palpation. Not worsened with changes in position. +photophobia -sound sensitivity Reports nausea q3days, no vomiting.Sometimes associated with b/l eyelid twitching.Not improved with OTCs, was also trialed on sumatriptan which did not help. No changes in speech/motor/sensory symptoms. Reports some small forgetfulnes.no stiffnes in neck or neck pain. Reports two days prior had bleeding in R ear (states it covered two q-tips).  Also reports ringing in R ear.  Advocates two weeks of intermittent b/l blurred vision, previously wore glasses. Has ophtho appointment in a fw months. Reports 1.5-2 weeks duration of room spinning sensation few seconds sometimes a/w nausea worsened with positional changes. 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 Just here and there ( reduced to 2 days/ week, milder and less persistent). Goes away without taking medication, she sits down with her eyes closed and makes the room dark. No dizziness anymore, did not take the meclizine.  MRI brain w/wo unremarkable  Prior headaches: Since childhood, similar in nature but not as persistent, occurring few times per month.

## 2025-07-25 NOTE — HISTORY OF PRESENT ILLNESS
[FreeTextEntry1] : 29 year-old R handed woman, PMH L breast DCIS (ER negative (0%), [place] 31-40%, HER-2 negative (diagnosed at age 28)) s/p 7 rounds of chemotherapy (adriamycin, cytoxan, neulasta then on taxol) May to August 2024, on exemestane since 10/2024 and recently switched to anastrozole, alpha thalassemia trait, iron deficiency anemia (reports resolved) presenting for headache.    Information obtained from patient, EMR.  Reports 1 month duration of persistent headaches, location migrating from center to left to R side of head, not tender to palpation. Not worsened with changes in position. +photophobia -sound sensitivity Reports nausea q3days, no vomiting.Sometimes associated with b/l eyelid twitching.Not improved with OTCs, was also trialed on sumatriptan which did not help. No changes in speech/motor/sensory symptoms. Reports some small forgetfulnes.no stiffnes in neck or neck pain. Reports two days prior had bleeding in R ear (states it covered two q-tips).  Also reports ringing in R ear.  Advocates two weeks of intermittent b/l blurred vision, previously wore glasses. Has ophtho appointment in a fw months. Reports 1.5-2 weeks duration of room spinning sensation few seconds sometimes a/w nausea worsened with positional changes. 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 Just here and there ( reduced to 2 days/ week, milder and less persistent). Goes away without taking medication, she sits down with her eyes closed and makes the room dark. No dizziness anymore, did not take the meclizine.  MRI brain w/wo unremarkable  Prior headaches: Since childhood, similar in nature but not as persistent, occurring few times per month.

## 2025-07-25 NOTE — DISCUSSION/SUMMARY
[FreeTextEntry1] : 29 year old R handed woman, PMH L breast DCIS s/p 7 rounds of chemotherapy (adriamycin, cytoxan, neulasta then on taxol) May to August 2024, on exemestane since 10/2024 and recently switched to anastrozole , alpha thalassemia trait, iron deficiency anemia (reports resolved) presenting for headache.  Impression:  (1) Migrainous headache - +photophobia, nausea (2) Vertigo few seconds in duration  Recommendations: [] nortryptiline 10 QHS   [] rizatriptan 10 PRN for breakthrough headache [] meclizine 12.5 PRN for vertigo [] MRI brain wwo contrast [] f/u 6 weeks 
[FreeTextEntry1] : 29 year old R handed woman, PMH L breast DCIS s/p 7 rounds of chemotherapy (adriamycin, cytoxan, neulasta then on taxol) May to August 2024, on exemestane since 10/2024 and recently switched to anastrozole , alpha thalassemia trait, iron deficiency anemia (reports resolved) presenting for headache.  Impression:  (1) Migrainous headache - +photophobia, nausea (2) Vertigo few seconds in duration  Recommendations: [] nortryptiline 10 QHS   [] rizatriptan 10 PRN for breakthrough headache [] meclizine 12.5 PRN for vertigo [] MRI brain wwo contrast [] f/u 6 weeks 
no

## 2025-07-25 NOTE — PHYSICAL EXAM
[General Appearance - Alert] : alert [General Appearance - In No Acute Distress] : in no acute distress [General Appearance - Well Nourished] : well nourished [General Appearance - Well-Appearing] : healthy appearing [Oriented To Time, Place, And Person] : oriented to person, place, and time [Affect] : the affect was normal [Person] : oriented to person [Place] : oriented to place [Time] : oriented to time [I: Normal Smell] : smell intact bilaterally [Cranial Nerves Optic (II)] : visual acuity intact bilaterally,  visual fields full to confrontation, pupils equal round and reactive to light [Cranial Nerves Oculomotor (III)] : extraocular motion intact [Cranial Nerves Trigeminal (V)] : facial sensation intact symmetrically [Cranial Nerves Facial (VII)] : face symmetrical [Cranial Nerves Vestibulocochlear (VIII)] : hearing was intact bilaterally [Cranial Nerves Glossopharyngeal (IX)] : tongue and palate midline [Cranial Nerves Accessory (XI - Cranial And Spinal)] : head turning and shoulder shrug symmetric [Cranial Nerves Hypoglossal (XII)] : there was no tongue deviation with protrusion [Motor Tone] : muscle tone was normal in all four extremities [Motor Handedness Right-Handed] : the patient is right hand dominant [Sensation Tactile Decrease] : light touch was intact [Abnormal Walk] : normal gait [Balance] : balance was intact [Plantar Reflex Right Only] : normal on the right [Plantar Reflex Left Only] : normal on the left [FreeTextEntry6] : 5/5 b/l UE, LE

## 2025-07-25 NOTE — ASSESSMENT
[FreeTextEntry1] : 29 year old R handed woman, PMH L breast DCIS s/p 7 rounds of chemotherapy (adriamycin, cytoxan, neulasta then on taxol) May to August 2024, on exemestane since 10/2024 and recently switched to anastrozole , alpha thalassemia trait, iron deficiency anemia (reports resolved) presenting for headache likely primary headache disorder- migraine. Improvement in character and frequency with Nortriptyline 10mg. MRI brain 5/25 w/wo contrast without acute findings.   Impression: (1) Migrainous headache - +photophobia, nausea (2) Vertigo few seconds in duration- Now resolved  Recommendations: [] Continue Nortriptyline 10 QHS [] Rizatriptan 10 PRN for breakthrough headache- not used [] RTC in 6 months, sooner PRN [] Counselled to avoid migraine triggers All questions answered and MRI discussed with patient  Discussed with Dr. Gonzalez, neurology attending